# Patient Record
Sex: MALE | Race: AMERICAN INDIAN OR ALASKA NATIVE | URBAN - METROPOLITAN AREA
[De-identification: names, ages, dates, MRNs, and addresses within clinical notes are randomized per-mention and may not be internally consistent; named-entity substitution may affect disease eponyms.]

---

## 2017-01-09 ENCOUNTER — HOSPITAL ENCOUNTER (EMERGENCY)
Facility: CLINIC | Age: 37
Discharge: HOME | End: 2017-01-09
Attending: EMERGENCY MEDICINE | Admitting: EMERGENCY MEDICINE
Payer: MEDICAID

## 2017-01-09 VITALS
DIASTOLIC BLOOD PRESSURE: 97 MMHG | RESPIRATION RATE: 16 BRPM | SYSTOLIC BLOOD PRESSURE: 129 MMHG | OXYGEN SATURATION: 94 % | HEART RATE: 95 BPM | TEMPERATURE: 95 F | WEIGHT: 218.56 LBS | BODY MASS INDEX: 31.36 KG/M2

## 2017-01-09 DIAGNOSIS — L60.0 INGROWING TOENAIL OF RIGHT FOOT: ICD-10-CM

## 2017-01-09 PROCEDURE — 99284 EMERGENCY DEPT VISIT MOD MDM: CPT | Mod: Z6 | Performed by: EMERGENCY MEDICINE

## 2017-01-09 PROCEDURE — 99282 EMERGENCY DEPT VISIT SF MDM: CPT | Performed by: EMERGENCY MEDICINE

## 2017-01-09 ASSESSMENT — ENCOUNTER SYMPTOMS
SHORTNESS OF BREATH: 0
ABDOMINAL PAIN: 0
FEVER: 0

## 2017-01-09 NOTE — ED PROVIDER NOTES
History     Chief Complaint   Patient presents with     Ingrown Toenail     States he has had this for a month.  Tried to tear it off himself.  Thinks it is infected.      HPI  Isaiah Hurst is a 36 year old male who presents to the Emergency Department with an right toe pain. Patient reports pain in his first toe on his right foot. He states that he has had this pain for about a month. He has been trying to pull off an ingrown toenail but has been unable to do this successfully. He has no history of ingrown toenail on his foot.     Patient is a smoker.    I have reviewed the Medications, Allergies, Past Medical and Surgical History, and Social History in the Contact At Once! system.    PAST MEDICAL HISTORY  Past Medical History   Diagnosis Date     Hypertension      Depressive disorder      PAST SURGICAL HISTORY  No past surgical history on file.  FAMILY HISTORY  No family history on file.  SOCIAL HISTORY  Social History   Substance Use Topics     Smoking status: Current Every Day Smoker -- 0.25 packs/day     Smokeless tobacco: Never Used     Alcohol Use: Yes     MEDICATIONS  No current facility-administered medications for this encounter.     Current Outpatient Prescriptions   Medication     amoxicillin-clavulanate (AUGMENTIN) 875-125 MG per tablet     hydrOXYzine (ATARAX) 50 MG tablet     FLUOXETINE HCL PO     PROPRANOLOL HCL PO     ALLERGIES  Allergies   Allergen Reactions     Naproxen Rash        Review of Systems   Constitutional: Negative for fever.   Respiratory: Negative for shortness of breath.    Cardiovascular: Negative for chest pain.   Gastrointestinal: Negative for abdominal pain.   Musculoskeletal:        Right great toe pain   All other systems reviewed and are negative.      Physical Exam   BP: (!) 150/95 mmHg  Pulse: 95  Temp: 95  F (35  C)  Resp: 16  Weight: 99.139 kg (218 lb 9 oz)  SpO2: 98 %  Physical Exam   Constitutional: No distress.   HENT:   Head: Atraumatic.   Mouth/Throat: Oropharynx is  clear and moist. No oropharyngeal exudate.   Eyes: Pupils are equal, round, and reactive to light. No scleral icterus.   Cardiovascular: Normal heart sounds and intact distal pulses.    Pulmonary/Chest: Breath sounds normal. No respiratory distress.   Abdominal: Soft. Bowel sounds are normal. There is no tenderness.   Musculoskeletal: He exhibits no edema.        Right foot: There is tenderness (over medial aspect of right greater hallux, there is skin overgrowing the toe associated redness and swelling) and swelling. There is normal range of motion and no bony tenderness.   Skin: Skin is warm. No rash noted. He is not diaphoretic.   Nursing note and vitals reviewed.      ED Course   Procedures               Labs Ordered and Resulted from Time of ED Arrival Up to the Time of Departure from the ED - No data to display    Assessments & Plan (with Medical Decision Making)   36-year-old male presents for evaluation of right great toe pain.  He is found to have evidence of an infected right greater hallux.  He was given instructions on warm water soaks as well as manipulating the medial skin to allow the nail to grow up.  He will be discharged on Augmentin with instructions to use Tylenol and/or ibuprofen.  I have reviewed the nursing notes.    I have reviewed the findings, diagnosis, plan and need for follow up with the patient.    New Prescriptions    AMOXICILLIN-CLAVULANATE (AUGMENTIN) 875-125 MG PER TABLET    Take 1 tablet by mouth 2 times daily for 7 days       Final diagnoses:   Ingrowing toenail of right foot     IDaniel, am serving as a trained medical scribe to document services personally performed by Koko Ribeiro MD, based on the provider's statements to me.      Koko DUMONT MD, was physically present and have reviewed and verified the accuracy of this note documented by Daniel Mack.    1/9/2017   [facility], EMERGENCY DEPARTMENT      Med Ribeiro MD  01/09/17 [id number]

## 2017-01-09 NOTE — ED AVS SNAPSHOT
[facility], Emergency Department    [address]    [facility] [address]    Phone:  [phone]    Fax:  [phone]                                       Isaiah Hurst   MRN: [id number]    Department:  [facility], Emergency Department   Date of Visit:  1/9/2017           Patient Information     Date Of Birth          1980        Your diagnoses for this visit were:     Ingrowing toenail of right foot        You were seen by Med Ribeiro MD.      Follow-up Information     Follow up with Lore Arellano NP.    Contact information:     [facility]  [address]   [facility] [address]  [phone]          Discharge Instructions         Ingrown Toenail, Infected (Antibiotics, No Excision)  An ingrown toenail occurs when the nail grows sideways into the skin alongside the nail. This can cause pain. It can also lead to an infection with redness, swelling and sometimes drainage.  Cause  The most common cause of an ingrown toenail is trimming your nails wrong. Most people trim the nails too close to the skin and try to round the nail too tightly around the shape of the toe. When you do this, the nail can grow into the skin of your toe. While it may look nice, your toenail can grow into the skin and cause an infection.  Other causes include injury or wearing shoes that are too short or tight. This can cause the same problem that happens when trimming your nails. Your genetics can also make this more likely to happen.  Symptoms  The following are the most common symptoms of an ingrown toenail:     Pain    Redness    Swelling    Drainage  Treatment  The best thing to do for an ingrown toenail is treat it as soon as you see there is a problem. The longer you wait to do something, the worse it is likely to get. Sometimes it gets worse quickly, other times it may take awhile. It can even feel better for a while, and then get worse.  Inflammation  If the infection is mild, you  may be able to take care of it at home with the following measures:    Frequent warm water soaks    Keeping it clean    Wearing loose, comfortable shoes or sandals  Another method involves using a small piece of cotton or waxed dental floss to gently lift up the corner of the problem nail. Change the cotton or floss frequently, especially if it gets dirty.  Infection  If your infection is mild, and home care isn't working, or if the infection gets worse, see your health care provider. Signs of worsening infection include:    Swelling    Redness    Pus drainage  In some cases, you may need antibiotics along with warm soaks. If after 2 to 3 days of antibiotic  the toenail doesn't get better or gets worse, part of the nail may need to be removed to drain the infection. With treatment, it can take 1 to 2 weeks to clear up completely.  Home care  Wound care  For the next 3 days, soak and clean your toe in warm water a few times a day.  1) Twice a day for the first 3 days, clean and soak the toe as follows:    Soak your foot in a tub of warm water for 5 minutes. Or, hold your toe under a faucet of warm running water for 5 minutes.    Clean any remaining crust away with soap and water using a cotton swab.    Put a small amount of antibiotic ointment on the infected area.  2) Change the dressing or bandage every time you soak or clean it, or whenever it becomes wet or dirty.  3) If you were prescribed antibiotics, take them as directed until they are all gone.  4) Wear comfortable shoes with a lot of toe room, or open-toe sandals, while your toe is healing.  Medications    You can take acetaminophen or ibuprofen for pain, unless you were given a different pain medicine to use. If you have chronic liver or kidney disease, ever had a stomach ulcer or gastrointestinal bleeding, or are taking blood thinner medications, talk with your doctor before using these medicines.    If you were given antibiotics, take them until they are  used up or your doctor tells you to stop, even if the wound looks better.  Prevention  To prevent ingrown toenails:  1) Wear shoes that fit well. Avoid shoes that pinch the toes together.  2) When you trim your toenails, do not cut them too short. Cut straight across at the top and do not round the edges.  3) Do not use a sharp object to clean under your nail since this might cause an infection.  4) If the toenail starts to grow into the skin again, put a small piece of waxed dental floss or cotton under that side of the nail to help it grow out straight.  Follow-up care  Follow up with your doctor or this facility as advised by our staff.  When to seek medical care  Get prompt medical attention if any of the following occur:    Increasing redness, pain or swelling of the toe    Red streaks in the skin leading away from the wound    Pus or fluid drainage    Fever of 100.4  F (38  C) or higher, or as directed by your health care provider    1874-2554 [employer]. [address] [place] [address]. All rights reserved. This information is not intended as a substitute for professional medical care. Always follow your healthcare professional's instructions.          Understanding Ingrown Toenails  An ingrown nail is the result of a nail growing into the skin that surrounds it. This often occurs at either edge of the big toe. Ingrown nails may be caused by improper trimming, inherited nail deformities, injuries, fungal infections, or pressure.    Symptoms  Ingrown nails may cause pain at the tip of the toe or all the way to the base of the toe. The pain is often worse while walking. An ingrown nail may also lead to infection, inflammation, or a more serious condition. If it s infected, you might see pus or redness.  Evaluation  To determine the extent of your problem, your doctor examines and possibly presses the painful area. If other problems are suspected, blood tests, cultures, or X-rays may be  done as well.  Treatment  If the nail isn t infected, your doctor may trim the corner of it to help relieve your symptoms. He or she may need to remove one side of your nail back to the cuticle. The base of the nail may then be treated with a chemical to keep the ingrown part from growing back. Severe infections or ingrown nails may require antibiotics and temporary or permanent removal of a portion of the nail. To prevent pain, a local anesthetic may be used in these procedures. This treatment is usually done at your doctor's office.  Prevention  Many nail problems can be prevented by wearing the right shoes and trimming your nails properly. To help avoid infection, keep your feet clean and dry. If you have diabetes, talk with your doctor before doing any foot self-care.    The right shoes: Get your feet measured (your size may change as you age). Wear shoes that are supportive and roomy enough for your toes to wiggle. Look for shoes made of natural materials such as leather, which allow your feet to breathe.    Proper trimming: To avoid problems, trim your toenails straight across without cutting down into the corners. If you can t trim your own nails, ask your podiatrist to do so for you.    1771-1397 [employer]. [address] [place] [address], [place] [address]. All rights reserved. This information is not intended as a substitute for professional medical care. Always follow your healthcare professional's instructions.          24 Hour Appointment Hotline       To make an appointment at any [facility], call [phone] ([phone]). If you don't have a family doctor or clinic, we will help you find one. [facility] are conveniently located to serve the needs of you and your family.             Review of your medicines      START taking        Dose / Directions Last dose taken    amoxicillin-clavulanate 875-125 MG per tablet   Commonly known as:  AUGMENTIN   Dose:  1 tablet   Quantity:  14  "tablet        Take 1 tablet by mouth 2 times daily for 7 days   Refills:  0          Our records show that you are taking the medicines listed below. If these are incorrect, please call your family doctor or clinic.        Dose / Directions Last dose taken    FLUOXETINE HCL PO   Dose:  20 mg        Take 20 mg by mouth daily   Refills:  0        hydrOXYzine 50 MG tablet   Commonly known as:  ATARAX   Dose:  50 mg   Quantity:  10 tablet        Take 1 tablet (50 mg) by mouth every 6 hours as needed for itching   Refills:  0        PROPRANOLOL HCL PO   Dose:  10 mg        Take 10 mg by mouth 3 times daily   Refills:  0                Prescriptions were sent or printed at these locations (1 Prescription)                   Other Prescriptions                Printed at Department/Unit printer (1 of 1)         amoxicillin-clavulanate (AUGMENTIN) 875-125 MG per tablet                Orders Needing Specimen Collection     None      Pending Results     No orders found from 1/8/2017 to 1/10/2017.            Pending Culture Results     No orders found from 1/8/2017 to 1/10/2017.            Thank you for choosing [facility]       Thank you for choosing [facility] for your care. Our goal is always to provide you with excellent care. Hearing back from our patients is one way we can continue to improve our services. Please take a few minutes to complete the written survey that you may receive in the mail after you visit with us. Thank you!        [employer] Information     [employer] lets you send messages to your doctor, view your test results, renew your prescriptions, schedule appointments and more. To sign up, go to [email or web address] . Click on \"Log in\" on the left side of the screen, which will take you to the Welcome page. Then click on \"Sign up Now\" on the right side of the page.     You will be asked to enter the access code listed below, as well as some personal information. Please follow the directions to create your username " and password.     Your access code is: [email or web address]  Expires: 2017  2:49 PM     Your access code will  in 90 days. If you need help or a new code, please call your [facility] clinic or [phone].        Care EveryWhere ID     This is your Care EveryWhere ID. This could be used by other organizations to access your [facility] medical records  [id number]        After Visit Summary       This is your record. Keep this with you and show to your community pharmacist(s) and doctor(s) at your next visit.

## 2017-01-09 NOTE — ED AVS SNAPSHOT
[facility], [place], Emergency Department    [address]    [facility] [address]    Phone:  [phone]    Fax:  [phone]                                       Isaiah Hurst   MRN: [id number]    Department:  [facility], Emergency Department   Date of Visit:  1/9/2017           After Visit Summary Signature Page     I have received my discharge instructions, and my questions have been answered. I have discussed any challenges I see with this plan with the nurse or doctor.    ..........................................................................................................................................  Patient/Patient Representative Signature      ..........................................................................................................................................  Patient Representative Print Name and Relationship to Patient    ..................................................               ................................................  Date                                            Time    ..........................................................................................................................................  Reviewed by Signature/Title    ...................................................              ..............................................  Date                                                            Time

## 2017-01-09 NOTE — DISCHARGE INSTRUCTIONS
Ingrown Toenail, Infected (Antibiotics, No Excision)  An ingrown toenail occurs when the nail grows sideways into the skin alongside the nail. This can cause pain. It can also lead to an infection with redness, swelling and sometimes drainage.  Cause  The most common cause of an ingrown toenail is trimming your nails wrong. Most people trim the nails too close to the skin and try to round the nail too tightly around the shape of the toe. When you do this, the nail can grow into the skin of your toe. While it may look nice, your toenail can grow into the skin and cause an infection.  Other causes include injury or wearing shoes that are too short or tight. This can cause the same problem that happens when trimming your nails. Your genetics can also make this more likely to happen.  Symptoms  The following are the most common symptoms of an ingrown toenail:     Pain    Redness    Swelling    Drainage  Treatment  The best thing to do for an ingrown toenail is treat it as soon as you see there is a problem. The longer you wait to do something, the worse it is likely to get. Sometimes it gets worse quickly, other times it may take awhile. It can even feel better for a while, and then get worse.  Inflammation  If the infection is mild, you may be able to take care of it at home with the following measures:    Frequent warm water soaks    Keeping it clean    Wearing loose, comfortable shoes or sandals  Another method involves using a small piece of cotton or waxed dental floss to gently lift up the corner of the problem nail. Change the cotton or floss frequently, especially if it gets dirty.  Infection  If your infection is mild, and home care isn't working, or if the infection gets worse, see your health care provider. Signs of worsening infection include:    Swelling    Redness    Pus drainage  In some cases, you may need antibiotics along with warm soaks. If after 2 to 3 days of antibiotic  the toenail doesn't get  better or gets worse, part of the nail may need to be removed to drain the infection. With treatment, it can take 1 to 2 weeks to clear up completely.  Home care  Wound care  For the next 3 days, soak and clean your toe in warm water a few times a day.  1) Twice a day for the first 3 days, clean and soak the toe as follows:    Soak your foot in a tub of warm water for 5 minutes. Or, hold your toe under a faucet of warm running water for 5 minutes.    Clean any remaining crust away with soap and water using a cotton swab.    Put a small amount of antibiotic ointment on the infected area.  2) Change the dressing or bandage every time you soak or clean it, or whenever it becomes wet or dirty.  3) If you were prescribed antibiotics, take them as directed until they are all gone.  4) Wear comfortable shoes with a lot of toe room, or open-toe sandals, while your toe is healing.  Medications    You can take acetaminophen or ibuprofen for pain, unless you were given a different pain medicine to use. If you have chronic liver or kidney disease, ever had a stomach ulcer or gastrointestinal bleeding, or are taking blood thinner medications, talk with your doctor before using these medicines.    If you were given antibiotics, take them until they are used up or your doctor tells you to stop, even if the wound looks better.  Prevention  To prevent ingrown toenails:  1) Wear shoes that fit well. Avoid shoes that pinch the toes together.  2) When you trim your toenails, do not cut them too short. Cut straight across at the top and do not round the edges.  3) Do not use a sharp object to clean under your nail since this might cause an infection.  4) If the toenail starts to grow into the skin again, put a small piece of waxed dental floss or cotton under that side of the nail to help it grow out straight.  Follow-up care  Follow up with your doctor or this facility as advised by our staff.  When to seek medical care  Get prompt  medical attention if any of the following occur:    Increasing redness, pain or swelling of the toe    Red streaks in the skin leading away from the wound    Pus or fluid drainage    Fever of 100.4  F (38  C) or higher, or as directed by your health care provider    9102-7111 [employer]. [address] [place] [address]. All rights reserved. This information is not intended as a substitute for professional medical care. Always follow your healthcare professional's instructions.          Understanding Ingrown Toenails  An ingrown nail is the result of a nail growing into the skin that surrounds it. This often occurs at either edge of the big toe. Ingrown nails may be caused by improper trimming, inherited nail deformities, injuries, fungal infections, or pressure.    Symptoms  Ingrown nails may cause pain at the tip of the toe or all the way to the base of the toe. The pain is often worse while walking. An ingrown nail may also lead to infection, inflammation, or a more serious condition. If it s infected, you might see pus or redness.  Evaluation  To determine the extent of your problem, your doctor examines and possibly presses the painful area. If other problems are suspected, blood tests, cultures, or X-rays may be done as well.  Treatment  If the nail isn t infected, your doctor may trim the corner of it to help relieve your symptoms. He or she may need to remove one side of your nail back to the cuticle. The base of the nail may then be treated with a chemical to keep the ingrown part from growing back. Severe infections or ingrown nails may require antibiotics and temporary or permanent removal of a portion of the nail. To prevent pain, a local anesthetic may be used in these procedures. This treatment is usually done at your doctor's office.  Prevention  Many nail problems can be prevented by wearing the right shoes and trimming your nails properly. To help avoid infection, keep your  feet clean and dry. If you have diabetes, talk with your doctor before doing any foot self-care.    The right shoes: Get your feet measured (your size may change as you age). Wear shoes that are supportive and roomy enough for your toes to wiggle. Look for shoes made of natural materials such as leather, which allow your feet to breathe.    Proper trimming: To avoid problems, trim your toenails straight across without cutting down into the corners. If you can t trim your own nails, ask your podiatrist to do so for you.    [phone] [employer]. [address] [place] [address], [place] [address]. All rights reserved. This information is not intended as a substitute for professional medical care. Always follow your healthcare professional's instructions.

## 2017-02-06 ENCOUNTER — TRANSFERRED RECORDS (OUTPATIENT)
Dept: HEALTH INFORMATION MANAGEMENT | Facility: CLINIC | Age: 37
End: 2017-02-06

## 2017-02-07 ENCOUNTER — ANESTHESIA EVENT (OUTPATIENT)
Dept: GASTROENTEROLOGY | Facility: CLINIC | Age: 37
DRG: 438 | End: 2017-02-07
Payer: COMMERCIAL

## 2017-02-07 ENCOUNTER — ANESTHESIA (OUTPATIENT)
Dept: GASTROENTEROLOGY | Facility: CLINIC | Age: 37
DRG: 438 | End: 2017-02-07
Payer: COMMERCIAL

## 2017-02-07 ENCOUNTER — APPOINTMENT (OUTPATIENT)
Dept: ULTRASOUND IMAGING | Facility: CLINIC | Age: 37
DRG: 438 | End: 2017-02-07
Attending: INTERNAL MEDICINE
Payer: COMMERCIAL

## 2017-02-07 ENCOUNTER — HOSPITAL ENCOUNTER (INPATIENT)
Facility: CLINIC | Age: 37
LOS: 1 days | Discharge: ANOTHER HOSPITAL | DRG: 438 | End: 2017-02-07
Attending: INTERNAL MEDICINE | Admitting: INTERNAL MEDICINE
Payer: COMMERCIAL

## 2017-02-07 VITALS
DIASTOLIC BLOOD PRESSURE: 124 MMHG | TEMPERATURE: 100.3 F | SYSTOLIC BLOOD PRESSURE: 172 MMHG | OXYGEN SATURATION: 94 % | HEART RATE: 100 BPM | RESPIRATION RATE: 20 BRPM | BODY MASS INDEX: 32.04 KG/M2 | WEIGHT: 223.8 LBS | HEIGHT: 70 IN

## 2017-02-07 DIAGNOSIS — K85.91 ACUTE NECROTIZING PANCREATITIS: ICD-10-CM

## 2017-02-07 DIAGNOSIS — B37.81 ESOPHAGEAL CANDIDIASIS (H): ICD-10-CM

## 2017-02-07 DIAGNOSIS — F10.239 ALCOHOL DEPENDENCE WITH WITHDRAWAL WITH COMPLICATION (H): Primary | ICD-10-CM

## 2017-02-07 DIAGNOSIS — I82.890 SPLENIC VEIN THROMBOSIS: ICD-10-CM

## 2017-02-07 DIAGNOSIS — K29.21 ALCOHOLIC GASTRITIS WITH HEMORRHAGE, UNSPECIFIED CHRONICITY: ICD-10-CM

## 2017-02-07 LAB
ABO + RH BLD: NORMAL
ABO + RH BLD: NORMAL
ALBUMIN SERPL-MCNC: 2.6 G/DL (ref 3.4–5)
ALBUMIN UR-MCNC: 30 MG/DL
ALP SERPL-CCNC: 116 U/L (ref 40–150)
ALT SERPL W P-5'-P-CCNC: 149 U/L (ref 0–70)
AMYLASE SERPL-CCNC: 82 U/L (ref 30–110)
ANION GAP SERPL CALCULATED.3IONS-SCNC: 14 MMOL/L (ref 3–14)
ANION GAP SERPL CALCULATED.3IONS-SCNC: 14 MMOL/L (ref 3–14)
APPEARANCE UR: CLEAR
AST SERPL W P-5'-P-CCNC: 297 U/L (ref 0–45)
BASOPHILS # BLD AUTO: 0 10E9/L (ref 0–0.2)
BASOPHILS NFR BLD AUTO: 0 %
BILIRUB SERPL-MCNC: 2.4 MG/DL (ref 0.2–1.3)
BILIRUB UR QL STRIP: NEGATIVE
BLD GP AB SCN SERPL QL: NORMAL
BLOOD BANK CMNT PATIENT-IMP: NORMAL
BUN SERPL-MCNC: 13 MG/DL (ref 7–30)
BUN SERPL-MCNC: 15 MG/DL (ref 7–30)
CALCIUM SERPL-MCNC: 6.6 MG/DL (ref 8.5–10.1)
CALCIUM SERPL-MCNC: 6.7 MG/DL (ref 8.5–10.1)
CHLORIDE SERPL-SCNC: 94 MMOL/L (ref 94–109)
CHLORIDE SERPL-SCNC: 94 MMOL/L (ref 94–109)
CO2 SERPL-SCNC: 19 MMOL/L (ref 20–32)
CO2 SERPL-SCNC: 21 MMOL/L (ref 20–32)
COLOR UR AUTO: YELLOW
CREAT SERPL-MCNC: 0.71 MG/DL (ref 0.66–1.25)
CREAT SERPL-MCNC: 0.73 MG/DL (ref 0.66–1.25)
DIFFERENTIAL METHOD BLD: ABNORMAL
EOSINOPHIL # BLD AUTO: 0 10E9/L (ref 0–0.7)
EOSINOPHIL NFR BLD AUTO: 0 %
ERYTHROCYTE [DISTWIDTH] IN BLOOD BY AUTOMATED COUNT: 15.3 % (ref 10–15)
ERYTHROCYTE [DISTWIDTH] IN BLOOD BY AUTOMATED COUNT: 15.5 % (ref 10–15)
GFR SERPL CREATININE-BSD FRML MDRD: ABNORMAL ML/MIN/1.7M2
GFR SERPL CREATININE-BSD FRML MDRD: ABNORMAL ML/MIN/1.7M2
GLUCOSE SERPL-MCNC: 104 MG/DL (ref 70–99)
GLUCOSE SERPL-MCNC: 137 MG/DL (ref 70–99)
GLUCOSE UR STRIP-MCNC: NEGATIVE MG/DL
HCT VFR BLD AUTO: 47.8 % (ref 40–53)
HCT VFR BLD AUTO: 48.4 % (ref 40–53)
HGB BLD-MCNC: 16.6 G/DL (ref 13.3–17.7)
HGB BLD-MCNC: 16.9 G/DL (ref 13.3–17.7)
HGB UR QL STRIP: ABNORMAL
IMM GRANULOCYTES # BLD: 0.1 10E9/L (ref 0–0.4)
IMM GRANULOCYTES NFR BLD: 0.5 %
INR PPP: 1.42 (ref 0.86–1.14)
KETONES UR STRIP-MCNC: NEGATIVE MG/DL
LACTATE BLD-SCNC: 2.1 MMOL/L (ref 0.7–2.1)
LACTATE BLD-SCNC: 3.1 MMOL/L (ref 0.7–2.1)
LEUKOCYTE ESTERASE UR QL STRIP: NEGATIVE
LIPASE SERPL-CCNC: 814 U/L (ref 73–393)
LYMPHOCYTES # BLD AUTO: 1.5 10E9/L (ref 0.8–5.3)
LYMPHOCYTES NFR BLD AUTO: 5.9 %
MAGNESIUM SERPL-MCNC: 1.6 MG/DL (ref 1.6–2.3)
MCH RBC QN AUTO: 30.8 PG (ref 26.5–33)
MCH RBC QN AUTO: 31.3 PG (ref 26.5–33)
MCHC RBC AUTO-ENTMCNC: 34.7 G/DL (ref 31.5–36.5)
MCHC RBC AUTO-ENTMCNC: 34.9 G/DL (ref 31.5–36.5)
MCV RBC AUTO: 88 FL (ref 78–100)
MCV RBC AUTO: 90 FL (ref 78–100)
MONOCYTES # BLD AUTO: 1.4 10E9/L (ref 0–1.3)
MONOCYTES NFR BLD AUTO: 5.6 %
MUCOUS THREADS #/AREA URNS LPF: PRESENT /LPF
NEUTROPHILS # BLD AUTO: 21.6 10E9/L (ref 1.6–8.3)
NEUTROPHILS NFR BLD AUTO: 88 %
NITRATE UR QL: NEGATIVE
NRBC # BLD AUTO: 0 10*3/UL
NRBC BLD AUTO-RTO: 0 /100
PH UR STRIP: 6 PH (ref 5–7)
PHOSPHATE SERPL-MCNC: 2.7 MG/DL (ref 2.5–4.5)
PLATELET # BLD AUTO: 68 10E9/L (ref 150–450)
PLATELET # BLD AUTO: 86 10E9/L (ref 150–450)
POTASSIUM SERPL-SCNC: 3.8 MMOL/L (ref 3.4–5.3)
POTASSIUM SERPL-SCNC: 4.1 MMOL/L (ref 3.4–5.3)
PROT SERPL-MCNC: 6 G/DL (ref 6.8–8.8)
RBC # BLD AUTO: 5.3 10E12/L (ref 4.4–5.9)
RBC # BLD AUTO: 5.49 10E12/L (ref 4.4–5.9)
RBC #/AREA URNS AUTO: 2 /HPF (ref 0–2)
SODIUM SERPL-SCNC: 127 MMOL/L (ref 133–144)
SODIUM SERPL-SCNC: 128 MMOL/L (ref 133–144)
SP GR UR STRIP: >1.05 (ref 1–1.03)
SPECIMEN EXP DATE BLD: NORMAL
TRANS CELLS #/AREA URNS HPF: <1 /HPF (ref 0–1)
UPPER GI ENDOSCOPY: NORMAL
URN SPEC COLLECT METH UR: ABNORMAL
UROBILINOGEN UR STRIP-MCNC: 2 MG/DL (ref 0–2)
WBC # BLD AUTO: 22.7 10E9/L (ref 4–11)
WBC # BLD AUTO: 24.6 10E9/L (ref 4–11)
WBC #/AREA URNS AUTO: 4 /HPF (ref 0–2)

## 2017-02-07 PROCEDURE — 25000132 ZZH RX MED GY IP 250 OP 250 PS 637: Performed by: NURSE ANESTHETIST, CERTIFIED REGISTERED

## 2017-02-07 PROCEDURE — 87040 BLOOD CULTURE FOR BACTERIA: CPT | Performed by: NURSE PRACTITIONER

## 2017-02-07 PROCEDURE — 86901 BLOOD TYPING SEROLOGIC RH(D): CPT | Performed by: INTERNAL MEDICINE

## 2017-02-07 PROCEDURE — 25000132 ZZH RX MED GY IP 250 OP 250 PS 637: Performed by: INTERNAL MEDICINE

## 2017-02-07 PROCEDURE — 25000128 H RX IP 250 OP 636

## 2017-02-07 PROCEDURE — 25000128 H RX IP 250 OP 636: Performed by: INTERNAL MEDICINE

## 2017-02-07 PROCEDURE — 81001 URINALYSIS AUTO W/SCOPE: CPT | Performed by: NURSE PRACTITIONER

## 2017-02-07 PROCEDURE — 83605 ASSAY OF LACTIC ACID: CPT | Performed by: INTERNAL MEDICINE

## 2017-02-07 PROCEDURE — 37000008 ZZH ANESTHESIA TECHNICAL FEE, 1ST 30 MIN: Performed by: INTERNAL MEDICINE

## 2017-02-07 PROCEDURE — 36415 COLL VENOUS BLD VENIPUNCTURE: CPT | Performed by: NURSE PRACTITIONER

## 2017-02-07 PROCEDURE — 80053 COMPREHEN METABOLIC PANEL: CPT | Performed by: INTERNAL MEDICINE

## 2017-02-07 PROCEDURE — 40000141 ZZH STATISTIC PERIPHERAL IV START W/O US GUIDANCE

## 2017-02-07 PROCEDURE — 93975 VASCULAR STUDY: CPT | Mod: TC

## 2017-02-07 PROCEDURE — 25000131 ZZH RX MED GY IP 250 OP 636 PS 637: Performed by: INTERNAL MEDICINE

## 2017-02-07 PROCEDURE — 12000001 ZZH R&B MED SURG/OB UMMC

## 2017-02-07 PROCEDURE — 80048 BASIC METABOLIC PNL TOTAL CA: CPT | Performed by: NURSE PRACTITIONER

## 2017-02-07 PROCEDURE — 25800025 ZZH RX 258: Performed by: NURSE PRACTITIONER

## 2017-02-07 PROCEDURE — 25000125 ZZHC RX 250: Performed by: NURSE ANESTHETIST, CERTIFIED REGISTERED

## 2017-02-07 PROCEDURE — 25800025 ZZH RX 258: Performed by: INTERNAL MEDICINE

## 2017-02-07 PROCEDURE — 25000128 H RX IP 250 OP 636: Performed by: NURSE PRACTITIONER

## 2017-02-07 PROCEDURE — 43235 EGD DIAGNOSTIC BRUSH WASH: CPT | Performed by: INTERNAL MEDICINE

## 2017-02-07 PROCEDURE — 87106 FUNGI IDENTIFICATION YEAST: CPT | Performed by: INTERNAL MEDICINE

## 2017-02-07 PROCEDURE — 37000009 ZZH ANESTHESIA TECHNICAL FEE, EACH ADDTL 15 MIN: Performed by: INTERNAL MEDICINE

## 2017-02-07 PROCEDURE — 87102 FUNGUS ISOLATION CULTURE: CPT | Performed by: INTERNAL MEDICINE

## 2017-02-07 PROCEDURE — 25000128 H RX IP 250 OP 636: Performed by: NURSE ANESTHETIST, CERTIFIED REGISTERED

## 2017-02-07 PROCEDURE — 83735 ASSAY OF MAGNESIUM: CPT | Performed by: INTERNAL MEDICINE

## 2017-02-07 PROCEDURE — 86850 RBC ANTIBODY SCREEN: CPT | Performed by: INTERNAL MEDICINE

## 2017-02-07 PROCEDURE — 25800025 ZZH RX 258: Performed by: NURSE ANESTHETIST, CERTIFIED REGISTERED

## 2017-02-07 PROCEDURE — 85025 COMPLETE CBC W/AUTO DIFF WBC: CPT | Performed by: INTERNAL MEDICINE

## 2017-02-07 PROCEDURE — 36415 COLL VENOUS BLD VENIPUNCTURE: CPT | Performed by: INTERNAL MEDICINE

## 2017-02-07 PROCEDURE — 86900 BLOOD TYPING SEROLOGIC ABO: CPT | Performed by: INTERNAL MEDICINE

## 2017-02-07 PROCEDURE — 25000125 ZZHC RX 250: Performed by: INTERNAL MEDICINE

## 2017-02-07 PROCEDURE — 85610 PROTHROMBIN TIME: CPT | Performed by: INTERNAL MEDICINE

## 2017-02-07 PROCEDURE — 82150 ASSAY OF AMYLASE: CPT | Performed by: INTERNAL MEDICINE

## 2017-02-07 PROCEDURE — 85027 COMPLETE CBC AUTOMATED: CPT | Performed by: NURSE PRACTITIONER

## 2017-02-07 PROCEDURE — 84100 ASSAY OF PHOSPHORUS: CPT | Performed by: INTERNAL MEDICINE

## 2017-02-07 PROCEDURE — 83690 ASSAY OF LIPASE: CPT | Performed by: INTERNAL MEDICINE

## 2017-02-07 RX ORDER — MEPERIDINE HYDROCHLORIDE 25 MG/ML
12.5 INJECTION INTRAMUSCULAR; INTRAVENOUS; SUBCUTANEOUS
Status: DISCONTINUED | OUTPATIENT
Start: 2017-02-07 | End: 2017-02-07 | Stop reason: HOSPADM

## 2017-02-07 RX ORDER — SODIUM CHLORIDE, SODIUM LACTATE, POTASSIUM CHLORIDE, CALCIUM CHLORIDE 600; 310; 30; 20 MG/100ML; MG/100ML; MG/100ML; MG/100ML
INJECTION, SOLUTION INTRAVENOUS CONTINUOUS PRN
Status: DISCONTINUED | OUTPATIENT
Start: 2017-02-07 | End: 2017-02-07

## 2017-02-07 RX ORDER — FLUCONAZOLE 2 MG/ML
200 INJECTION, SOLUTION INTRAVENOUS EVERY 24 HOURS
Status: DISCONTINUED | OUTPATIENT
Start: 2017-02-08 | End: 2017-02-07 | Stop reason: HOSPADM

## 2017-02-07 RX ORDER — POTASSIUM CHLORIDE 29.8 MG/ML
20 INJECTION INTRAVENOUS
Status: DISCONTINUED | OUTPATIENT
Start: 2017-02-07 | End: 2017-02-07 | Stop reason: HOSPADM

## 2017-02-07 RX ORDER — POTASSIUM CHLORIDE 1.5 G/1.58G
20-40 POWDER, FOR SOLUTION ORAL
Status: DISCONTINUED | OUTPATIENT
Start: 2017-02-07 | End: 2017-02-07 | Stop reason: HOSPADM

## 2017-02-07 RX ORDER — SODIUM CHLORIDE, SODIUM LACTATE, POTASSIUM CHLORIDE, CALCIUM CHLORIDE 600; 310; 30; 20 MG/100ML; MG/100ML; MG/100ML; MG/100ML
200 INJECTION, SOLUTION INTRAVENOUS CONTINUOUS
Qty: 1000 ML | Refills: 0 | DISCHARGE
Start: 2017-02-07

## 2017-02-07 RX ORDER — ONDANSETRON 4 MG/1
4 TABLET, ORALLY DISINTEGRATING ORAL EVERY 6 HOURS PRN
Status: DISCONTINUED | OUTPATIENT
Start: 2017-02-07 | End: 2017-02-07 | Stop reason: HOSPADM

## 2017-02-07 RX ORDER — POTASSIUM CHLORIDE 750 MG/1
20-40 TABLET, EXTENDED RELEASE ORAL
Status: DISCONTINUED | OUTPATIENT
Start: 2017-02-07 | End: 2017-02-07 | Stop reason: HOSPADM

## 2017-02-07 RX ORDER — PROPRANOLOL HYDROCHLORIDE 10 MG/1
10 TABLET ORAL 3 TIMES DAILY
Status: DISCONTINUED | OUTPATIENT
Start: 2017-02-07 | End: 2017-02-07 | Stop reason: HOSPADM

## 2017-02-07 RX ORDER — PROPRANOLOL HYDROCHLORIDE 10 MG/1
10 TABLET ORAL 3 TIMES DAILY
Qty: 90 TABLET | DISCHARGE
Start: 2017-02-07 | End: 2018-09-07

## 2017-02-07 RX ORDER — FLUCONAZOLE 2 MG/ML
200 INJECTION, SOLUTION INTRAVENOUS EVERY 24 HOURS
Qty: 3000 ML | DISCHARGE
Start: 2017-02-08

## 2017-02-07 RX ORDER — SODIUM CHLORIDE 9 MG/ML
INJECTION, SOLUTION INTRAVENOUS
Status: COMPLETED
Start: 2017-02-07 | End: 2017-02-07

## 2017-02-07 RX ORDER — LORAZEPAM 1 MG/1
1-4 TABLET ORAL EVERY 30 MIN PRN
Status: DISCONTINUED | OUTPATIENT
Start: 2017-02-07 | End: 2017-02-07 | Stop reason: HOSPADM

## 2017-02-07 RX ORDER — HEPARIN SODIUM 10000 [USP'U]/100ML
0-3500 INJECTION, SOLUTION INTRAVENOUS CONTINUOUS
DISCHARGE
Start: 2017-02-07 | End: 2018-09-07

## 2017-02-07 RX ORDER — HEPARIN SODIUM 10000 [USP'U]/100ML
0-3500 INJECTION, SOLUTION INTRAVENOUS CONTINUOUS
Status: DISCONTINUED | OUTPATIENT
Start: 2017-02-07 | End: 2017-02-07

## 2017-02-07 RX ORDER — NALOXONE HYDROCHLORIDE 0.4 MG/ML
.1-.4 INJECTION, SOLUTION INTRAMUSCULAR; INTRAVENOUS; SUBCUTANEOUS
Status: DISCONTINUED | OUTPATIENT
Start: 2017-02-07 | End: 2017-02-07 | Stop reason: HOSPADM

## 2017-02-07 RX ORDER — ONDANSETRON 4 MG/1
4 TABLET, ORALLY DISINTEGRATING ORAL EVERY 30 MIN PRN
Status: ACTIVE | OUTPATIENT
Start: 2017-02-07 | End: 2017-02-07

## 2017-02-07 RX ORDER — FLUCONAZOLE 2 MG/ML
400 INJECTION, SOLUTION INTRAVENOUS ONCE
Status: COMPLETED | OUTPATIENT
Start: 2017-02-07 | End: 2017-02-07

## 2017-02-07 RX ORDER — ONDANSETRON 2 MG/ML
4 INJECTION INTRAMUSCULAR; INTRAVENOUS EVERY 6 HOURS PRN
Status: DISCONTINUED | OUTPATIENT
Start: 2017-02-07 | End: 2017-02-07 | Stop reason: HOSPADM

## 2017-02-07 RX ORDER — HYDROMORPHONE HYDROCHLORIDE 1 MG/ML
.5-1 INJECTION, SOLUTION INTRAMUSCULAR; INTRAVENOUS; SUBCUTANEOUS
Status: DISCONTINUED | OUTPATIENT
Start: 2017-02-07 | End: 2017-02-07 | Stop reason: HOSPADM

## 2017-02-07 RX ORDER — ONDANSETRON 2 MG/ML
4 INJECTION INTRAMUSCULAR; INTRAVENOUS EVERY 30 MIN PRN
Qty: 15 ML | DISCHARGE
Start: 2017-02-07

## 2017-02-07 RX ORDER — MAGNESIUM SULFATE HEPTAHYDRATE 40 MG/ML
4 INJECTION, SOLUTION INTRAVENOUS EVERY 4 HOURS PRN
Status: DISCONTINUED | OUTPATIENT
Start: 2017-02-07 | End: 2017-02-07 | Stop reason: HOSPADM

## 2017-02-07 RX ORDER — SODIUM CHLORIDE, SODIUM LACTATE, POTASSIUM CHLORIDE, CALCIUM CHLORIDE 600; 310; 30; 20 MG/100ML; MG/100ML; MG/100ML; MG/100ML
INJECTION, SOLUTION INTRAVENOUS CONTINUOUS
Status: DISCONTINUED | OUTPATIENT
Start: 2017-02-07 | End: 2017-02-07

## 2017-02-07 RX ORDER — ONDANSETRON 2 MG/ML
4 INJECTION INTRAMUSCULAR; INTRAVENOUS EVERY 30 MIN PRN
Status: ACTIVE | OUTPATIENT
Start: 2017-02-07 | End: 2017-02-07

## 2017-02-07 RX ORDER — HEPARIN SODIUM 10000 [USP'U]/100ML
0-3500 INJECTION, SOLUTION INTRAVENOUS CONTINUOUS
Status: DISCONTINUED | OUTPATIENT
Start: 2017-02-07 | End: 2017-02-07 | Stop reason: HOSPADM

## 2017-02-07 RX ORDER — PROPOFOL 10 MG/ML
INJECTION, EMULSION INTRAVENOUS CONTINUOUS PRN
Status: DISCONTINUED | OUTPATIENT
Start: 2017-02-07 | End: 2017-02-07

## 2017-02-07 RX ORDER — LORAZEPAM 1 MG/1
TABLET ORAL
Qty: 60 TABLET | Status: SHIPPED | DISCHARGE
Start: 2017-02-07

## 2017-02-07 RX ORDER — FENTANYL CITRATE 50 UG/ML
INJECTION, SOLUTION INTRAMUSCULAR; INTRAVENOUS PRN
Status: DISCONTINUED | OUTPATIENT
Start: 2017-02-07 | End: 2017-02-07

## 2017-02-07 RX ORDER — CEFTRIAXONE 1 G/1
1 INJECTION, POWDER, FOR SOLUTION INTRAMUSCULAR; INTRAVENOUS EVERY 24 HOURS
Status: DISCONTINUED | OUTPATIENT
Start: 2017-02-07 | End: 2017-02-07

## 2017-02-07 RX ORDER — ONDANSETRON 2 MG/ML
4 INJECTION INTRAMUSCULAR; INTRAVENOUS EVERY 6 HOURS PRN
Qty: 15 ML | DISCHARGE
Start: 2017-02-07

## 2017-02-07 RX ORDER — POTASSIUM CHLORIDE 7.45 MG/ML
10 INJECTION INTRAVENOUS
Status: DISCONTINUED | OUTPATIENT
Start: 2017-02-07 | End: 2017-02-07 | Stop reason: HOSPADM

## 2017-02-07 RX ORDER — SODIUM CHLORIDE, SODIUM LACTATE, POTASSIUM CHLORIDE, CALCIUM CHLORIDE 600; 310; 30; 20 MG/100ML; MG/100ML; MG/100ML; MG/100ML
INJECTION, SOLUTION INTRAVENOUS CONTINUOUS
Status: DISCONTINUED | OUTPATIENT
Start: 2017-02-07 | End: 2017-02-07 | Stop reason: HOSPADM

## 2017-02-07 RX ADMIN — SODIUM CHLORIDE 8 MG/HR: 9 INJECTION, SOLUTION INTRAVENOUS at 10:29

## 2017-02-07 RX ADMIN — HEPARIN SODIUM 1800 UNITS/HR: 10000 INJECTION, SOLUTION INTRAVENOUS at 17:53

## 2017-02-07 RX ADMIN — HYDROMORPHONE HYDROCHLORIDE 1 MG: 10 INJECTION, SOLUTION INTRAMUSCULAR; INTRAVENOUS; SUBCUTANEOUS at 17:23

## 2017-02-07 RX ADMIN — HYDROMORPHONE HYDROCHLORIDE 1 MG: 10 INJECTION, SOLUTION INTRAMUSCULAR; INTRAVENOUS; SUBCUTANEOUS at 09:02

## 2017-02-07 RX ADMIN — FLUCONAZOLE 400 MG: 2 INJECTION INTRAVENOUS at 17:32

## 2017-02-07 RX ADMIN — HYDROMORPHONE HYDROCHLORIDE 1 MG: 10 INJECTION, SOLUTION INTRAMUSCULAR; INTRAVENOUS; SUBCUTANEOUS at 12:30

## 2017-02-07 RX ADMIN — HYDROMORPHONE HYDROCHLORIDE 1 MG: 10 INJECTION, SOLUTION INTRAMUSCULAR; INTRAVENOUS; SUBCUTANEOUS at 20:08

## 2017-02-07 RX ADMIN — OCTREOTIDE ACETATE 50 MCG/HR: 200 INJECTION, SOLUTION INTRAVENOUS; SUBCUTANEOUS at 10:37

## 2017-02-07 RX ADMIN — SODIUM CHLORIDE, POTASSIUM CHLORIDE, SODIUM LACTATE AND CALCIUM CHLORIDE: 600; 310; 30; 20 INJECTION, SOLUTION INTRAVENOUS at 17:32

## 2017-02-07 RX ADMIN — SODIUM CHLORIDE, POTASSIUM CHLORIDE, SODIUM LACTATE AND CALCIUM CHLORIDE: 600; 310; 30; 20 INJECTION, SOLUTION INTRAVENOUS at 13:25

## 2017-02-07 RX ADMIN — FENTANYL CITRATE 50 MCG: 50 INJECTION, SOLUTION INTRAMUSCULAR; INTRAVENOUS at 13:29

## 2017-02-07 RX ADMIN — BENZOCAINE 2 APPLICATOR: 220 SPRAY, METERED PERIODONTAL at 13:29

## 2017-02-07 RX ADMIN — Medication 1 G: at 12:30

## 2017-02-07 RX ADMIN — HYDROMORPHONE HYDROCHLORIDE 1 MG: 10 INJECTION, SOLUTION INTRAMUSCULAR; INTRAVENOUS; SUBCUTANEOUS at 14:46

## 2017-02-07 RX ADMIN — FOLIC ACID: 5 INJECTION, SOLUTION INTRAMUSCULAR; INTRAVENOUS; SUBCUTANEOUS at 08:45

## 2017-02-07 RX ADMIN — HYDROMORPHONE HYDROCHLORIDE 0.5 MG: 10 INJECTION, SOLUTION INTRAMUSCULAR; INTRAVENOUS; SUBCUTANEOUS at 06:04

## 2017-02-07 RX ADMIN — LORAZEPAM 2 MG: 1 TABLET ORAL at 18:05

## 2017-02-07 RX ADMIN — SODIUM CHLORIDE: 0.9 INJECTION, SOLUTION INTRAVENOUS at 12:30

## 2017-02-07 RX ADMIN — ONDANSETRON 4 MG: 2 INJECTION INTRAMUSCULAR; INTRAVENOUS at 06:04

## 2017-02-07 RX ADMIN — PANTOPRAZOLE SODIUM 80 MG: 40 INJECTION, POWDER, FOR SOLUTION INTRAVENOUS at 06:38

## 2017-02-07 RX ADMIN — PROPOFOL 125 MCG/KG/MIN: 10 INJECTION, EMULSION INTRAVENOUS at 13:29

## 2017-02-07 RX ADMIN — SODIUM CHLORIDE, POTASSIUM CHLORIDE, SODIUM LACTATE AND CALCIUM CHLORIDE 1000 ML: 600; 310; 30; 20 INJECTION, SOLUTION INTRAVENOUS at 06:39

## 2017-02-07 RX ADMIN — LORAZEPAM 1 MG: 1 TABLET ORAL at 06:38

## 2017-02-07 RX ADMIN — MIDAZOLAM HYDROCHLORIDE 2 MG: 1 INJECTION, SOLUTION INTRAMUSCULAR; INTRAVENOUS at 13:25

## 2017-02-07 ASSESSMENT — PAIN DESCRIPTION - DESCRIPTORS
DESCRIPTORS: ACHING
DESCRIPTORS: SHARP
DESCRIPTORS: STABBING
DESCRIPTORS: STABBING

## 2017-02-07 NOTE — PROGRESS NOTES
[facility]  Transfer Triage Note    Date of call: 02/07/2017  Time of call: 2:02 AM    Reason for Transfer:Further diagnostic work up, management, and consultation for specialized care  Diagnosis: Necrotizing Pancreatitis    Outside Records: Available  Additional records requested to be faxed to [phone].    Stability of Patient: Patient is vitally stable, with no critical labs, and will likely remain stable throughout the transfer process    Expected Time of Arrival for Transfer: 0-8 hours    Recommendations for Management and Stabilization: Given    Additional Comments: Pt is a 36 year old with history of ETOH abuse, who presents with a 3 day history of generalized abdominal pain and nausea.  ED work-up showed an elevated WBC of 18, Lipase of 1000, and mild transaminitis.  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 He is otherwise vitally stable.  He will be transferred here for continued care, and GI consultation if needed.     Francis Fonseca MD

## 2017-02-07 NOTE — H&P
[facility] History [facility] Physical  Department of Internal Medicine    Patient Name: Isaiah Hurst MRN# [id number]   Age: 36 year old YOB: 1980     Date of Admission:2/7/2017    Primary care provider: Lore Arellano  Date of Service: 2/7/2017  Admitting Team: Atif Muse         Assessment and Plan:   Isaiah Hurst is a 36 year old male with history of ETOH abuse who presents with 3 day of diffuse abdominal pain, nausea, vomiting, coffee ground emesis and melena, and found to have necrotizing pancreatitis.    1. Necrotizing pancreatitis: likely acute on chronic secondary to ETOH, lipase is only mildly elevated, however CT scan at the OSH showed substantial necrosis of the tail of the pancreas without signs of infection. Is significantly hemo-concentrated.  Splenic vein thrombosis suggests there may be some chronicity.    - NPO, Bolus an additional L, and start IV@ 200 cc/hr, aggressive pain control   - CT scan will be uploaded to our system in the morning   - ADAT   - trend labs    2. UGIB: 3 day history of coffee ground emesis and melena and a several month history of intermittent sharp epigastric pain that improves with eating. Likely PUD vs ETOH gastritis, however given the ETOH abuse, and possible portal vein thrombosis an variceal bleed is not out of the question.   - 2 large bore IV   - protonix and octreotide gtt   - trend hgb q6   - consult GI for a possible EGD    3. ETOH abuse: long standing, has seen a  and was preparing to fill out a Rule 25.  Drinks 1 pint to 1 L of vodka daily, and has daily withdrawal symptoms but no history of seizures   - banana bag, continue IV thiamine   - MSSA protocol (is high risk for DTs)   - CD treatment consult for when he is stable.     4. Splenic vein thrombosis/portal vein thrombosis: likely secondary to pancreatitis, CT incidentally found a splenic vein thrombosis extending to the portal vein.     - may consider anti-coagulation  in the future (questionable benefit in the long run) but will hold off in the context of an upper GI bleed.     5. Hyponatremia: Na 129 at OSH, likely hypovolemic related to poor PO intake and repeated emesis vs pancreatitis   - recheck following adequate volume resuscitation    6. Transaminitis: at OSH /, likely secondary to ETOH abuse.  Bilirubin is mildly elevated at 2.3, without signs of obstruction.   - repeat abdominal U/S with dopplers      CODE: Full  Diet/IVF: NPO, banana bag at 200 cc   DVT ppx: mechanical given bleeding  Disposition/Admission Status: Inpatient    [facility]  Internal Medicine Hospitalist & Staff Physician  [facility]  Pager: [phone]           Chief Complaint:   Abdominal pain         HPI:   Mr Omkar Hurst is a 36 year old male with history of ETOH abuse who presents with a 3 day history of abdominal pain.  He states he was in his usual state of health, which includes drinking approximately a liter of vodka daily and experiencing intermittent abdominal pain (though typically this improves with eating) and undergoing transient withdrawal symptoms (sweating, shakiness).  Approximately 3 days ago he noticed his abdominal pain became more generalized, with associated nausea, which was stabbing in nature and made worse by eating.  He also had multiple episodes of emesis that was coffee in color.  He also noticed multiple episodes of melena.  He states he has not experienced these symptoms in the past.  His symptoms persisted and he had been unable to tolerate much oral intake, though continued to drink, until the day of admission.  He presented to an ED in [place], [place] where they observed an elevated WBC at 19.9, Hct of 60.6, Hgb of 15.5.  Other labs were notable for a lipase of 1000.  A CT was ordered that showed signs of pancreatitis with areas of necrosis in the tail of the pancreas as well as a splenic vein thrombosis extending to the portal vein.  He  was transferred here for additional management.         Past Medical History:     Past Medical History   Diagnosis Date     Hypertension      Depressive disorder      Reviewed         Past Surgical History:   No past surgical history on file.  No surgeries         Social History:   Currently single, continues to drink ETOH, 1 L of vodka a day, no other drug use.  Is interested in quitting and has seen about getting a rule 25 filled out.   Social History     Social History     Marital Status: Single     Spouse Name: N/A     Number of Children: N/A     Years of Education: N/A     Occupational History     Not on file.     Social History Main Topics     Smoking status: Current Every Day Smoker -- 0.25 packs/day     Smokeless tobacco: Never Used     Alcohol Use: Yes     Drug Use: No     Sexual Activity: Not on file     Other Topics Concern     Not on file     Social History Narrative            Family History:   No family history on file.  No family history of HTN, early heart disease, DM, pancreatitis, or CA.          Immunizations:     Immunization History   Administered Date(s) Administered     TDAP (ADACEL AGES 11-64) 07/13/2015              Allergies:    reviewed  Allergies   Allergen Reactions     Naproxen Rash            Medications:     Prior to Admission Medications   Prescriptions Last Dose Informant Patient Reported? Taking?   FLUOXETINE HCL PO 2/4/2017 at Unknown time Self Yes Yes   Sig: Take 20 mg by mouth daily    PROPRANOLOL HCL PO 02/04/2017 Self Yes No   Sig: Take 10 mg by mouth 3 times daily   hydrOXYzine (ATARAX) 50 MG tablet More than a month at Unknown time Self No No   Sig: Take 1 tablet (50 mg) by mouth every 6 hours as needed for itching      Facility-Administered Medications: None             Review of Systems:     A complete ROS was performed and is negative other than what is stated in the HPI.          Physical Exam:   Blood pressure 159/116, temperature 96.4  F (35.8  C), temperature source  Oral, resp. rate 16, SpO2 94 %.  General: slightly anxious, diaphoretic  HEENT: PERRL, EOMI, no scleral icterus, slightly dry MM  Neck: supple, no LAD  Chest/Resp: CTA b/l, normal work of breathing  Heart/CV: tachycardic, regula, S1/S2, no m,r,g  Abdomen/GI: conscious guarding around the epigastrium, diffusely tender to mild palpation and percussion, no rebound, active bowel sounds  Extremities/MSK: 2+ pulses, no edema  Skin: no rashes  Neuro: CN II-XII grossly intact, no focal deficits, slightly tremulous, no asterixis  Psych: A&Ox3    Tubes/Lines/Devices:             Data:   Reviewed OSH records, including labs and imaging reports, labs here are pending.     Francis Fonseca

## 2017-02-07 NOTE — IP AVS SNAPSHOT
Isaiah Ramos #[id number] (CSN: [id number])  (36 year old M)  (Adm: 17)     [id number]               UNIT 7D [facility]: [phone]            Patient Demographics     Patient Name Sex          Age SSN Address Phone    Isaiah Ramos Male 1980 (36 year old) [id number]  [facility] [id number] [phone] (Home)  [phone] (Mobile)      Emergency Contact(s)     Name Relation Home Work Mobile    Jane Lackey Significant other [phone]      Admission Information     Attending Provider Admitting Provider Admission Type Admission Date/Time    Bernardo Carrillo [place] [facility] [place] Ngo MD Urgent 17  0434    Discharge Date Hospital Service Auth/Cert Status Service Area     Internal Medicine [facility]    Unit Room/Bed Admission Status    U U7D 7521/7521-01 Admission (Confirmed)            Admission     Complaint    Necrotizing pancreatitis, Splenic vein thrombosis, Acute necrotizing pancreatitis, GI bleed      Hospital Account     Name Acct ID Class Status Primary Coverage    Isaiah Ramos [id number] Inpatient [facility]            Guarantor Account (for Hospital Account #[id number])     Name Relation to Pt Service Area Active? Acct Type    Isaiah Ramos Self FCS Yes Personal/Family    Address Phone          [address]  [place], [place] [address] [phone](H)              Coverage Information (for Hospital Account #[id number])     F/O Payor/Plan Precert #    [facility]     Subscriber Subscriber #    Isaiah Ramos [id number]    Address Phone    [address]  [address]  [place], [place] [address] [phone]                                                INTERAGENCY TRANSFER FORM - PHYSICIAN ORDERS   2017                       UNIT 7D [facility]: [phone]        "     Attending Provider: Bernardo Carrillo MD     Allergies:  Naproxen    Infection:  None   Service:  INTERNAL MED    Ht:  1.778 m (5' 10\")   Wt:  101.515 kg (223 lb 12.8 oz)   Admission Wt:  101.515 kg (223 lb 12.8 oz)    BMI:  32.11 kg/m 2   BSA:  2.24 m 2            ED Clinical Impression     Diagnosis Description Comment Added By Time Added    Alcohol dependence with withdrawal with complication (H) [F10.239] Alcohol dependence with withdrawal with complication (H) [F10.239]  Michelle Jaramillo NP 2/7/2017  3:43 PM    Alcoholic gastritis with hemorrhage, unspecified chronicity [K29.21] Alcoholic gastritis with hemorrhage, unspecified chronicity [K29.21]  Michelle Jaramillo NP 2/7/2017  3:46 PM    Acute necrotizing pancreatitis [K85.91] Acute necrotizing pancreatitis [K85.91]  Michelle Jaramillo NP 2/7/2017  3:46 PM    Esophageal candidiasis (H) [B37.81] Esophageal candidiasis (H) [B37.81]  Michelle Jaramillo NP 2/7/2017  4:16 PM    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 [I82.890]  Michelle Jaramillo NP 2/7/2017  7:01 PM      Hospital Problems as of 2/7/2017              Priority Class Noted POA    Acute necrotizing pancreatitis Medium  2/7/2017 Yes      Non-Hospital Problems as of 2/7/2017     None      Code Status History     Date Active Date Inactive Code Status Order ID Comments User Context    2/7/2017  4:19 PM  Full Code [id number]  Michelle Jaramillo NP Outpatient    2/7/2017  5:30 AM 2/7/2017  4:19 PM Full Code [id number]  Francis Fonseca MD Inpatient      Current Code Status     Date Active Code Status Order ID Comments User Context       Prior      Summary of Visit     Reason for your hospital stay       You were admitted to the hospital for pancreatitis and possible GI bleeding.  You received IVF and underwent an EGD to rule out a GI bleed.                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   Used for:  Esophageal candidiasis (H)        Dose:  200 mg   Start taking on:  2/8/2017   Inject 100 mLs (200 mg) into the vein every 24 hours   Quantity:  3000 mL   Refills:  0        heparin (porcine) 100-0.45 UNIT/ML-% infusion   Used for:  Splenic vein thrombosis        Dose:  0-3500 Units/hr   Inject 0-3,500 Units/hr into the vein continuous   Refills:  0        lactated ringers injection   Used for:  Acute necrotizing pancreatitis        Dose:  200 mL/hr   Inject 200 mL/hr into the vein continuous   Quantity:  1000 mL   Refills:  0        LORazepam 1 MG tablet   Commonly known as:  ATIVAN   Used for:  Alcohol dependence with withdrawal with complication (H)        For Score   greater than or equal to  20-give 2-4 mg-repeat assessment/vitals in 30 min. For Score 15-19-give 1-2 mg & repeat assessment/vitals in 1 hr For Score 8-14- give 1-2 mg & repeat assessment/vitals in 2-4 hrs. For Score less than 8-do not give & repeat assessment/vitals in 4 hrs. For Score less than 8 x 2 do not give &repeat assessment/vitals in 8 hrs.   Quantity:  60 tablet   Refills:  0        * ondansetron 2 MG/ML Soln injection   Commonly known as:  ZOFRAN   Used for:  Acute necrotizing pancreatitis        Dose:  4 mg   Inject 2 mLs (4 mg) into the vein every 6 hours as needed for nausea or vomiting   Quantity:  15 mL   Refills:  0        * ondansetron 2 MG/ML Soln injection   Commonly known as:  ZOFRAN   Used for:  Alcohol dependence with withdrawal with complication (H)        Dose:  4 mg   Inject 2 mLs (4 mg) into the vein every 30 minutes as needed for nausea or vomiting   Quantity:  15 mL   Refills:  0        pantoprazole 40 MG injection   Commonly known as:  PROTONIX   Used for:  Alcoholic gastritis with hemorrhage, unspecified chronicity        Dose:  40 mg   Inject 40 mg into the vein 2 times daily   Quantity:  30 each   Refills:  0        prochlorperazine 5 MG/ML  injection   Commonly known as:  COMPAZINE   Used for:  Acute necrotizing pancreatitis        Dose:  5-10 mg   Inject 1-2 mLs (5-10 mg) into the vein every 6 hours as needed for nausea or vomiting   Quantity:  120 mL   Refills:  0        * Notice:  This list has 2 medication(s) that are the same as other medications prescribed for you. Read the directions carefully, and ask your doctor or other care provider to review them with you.      CONTINUE these medications which may have CHANGED, or have new prescriptions. If we are uncertain of the size of tablets/capsules you have at home, strength may be listed as something that might have changed.        Dose / Directions Comments    FLUoxetine 20 MG capsule   Commonly known as:  PROzac   This may have changed:  medication strength   Used for:  Alcohol dependence with withdrawal with complication (H)        Dose:  20 mg   Take 1 capsule (20 mg) by mouth daily   Quantity:  30 capsule   Refills:  0          CONTINUE these medications which have NOT CHANGED        Dose / Directions Comments    propranolol 10 MG tablet   Commonly known as:  INDERAL   Used for:  Alcohol dependence with withdrawal with complication (H)        Dose:  10 mg   Take 1 tablet (10 mg) by mouth 3 times daily   Quantity:  90 tablet   Refills:  0          STOP taking     hydrOXYzine 50 MG tablet   Commonly known as:  ATARAX                   After Care     Activity - Up ad osman           Daily weights       Call Provider for weight gain of more than 2 pounds per day or 5 pounds per week.       Fall precautions           IV access       Peripheral IV.       Intake and output       Every shift       NPO                 Follow-Up Appointment Instructions     Follow Up and recommended labs and tests       Follow up with [facility] admitting MD upon arrival.  Recommended labs: CBC, CMP, lipase daily.  Hgb q 6 hours. INR daily for MELD calculation.             Statement of Approval     Ordered          02/07/17 1904  " I have reviewed and agree with all the recommendations and orders detailed in this document.   EFFECTIVE NOW     Approved and electronically signed by:  Michelle Jaramillo NP                                                 INTERAGENCY TRANSFER FORM - NURSING   2/7/2017                       UNIT [facility]D [facility]: [phone]            Attending Provider: Bernardo Carrillo MD     Allergies:  Naproxen    Infection:  None   Service:  INTERNAL MED    Ht:  1.778 m (5' 10\")   Wt:  101.515 kg (223 lb 12.8 oz)   Admission Wt:  101.515 kg (223 lb 12.8 oz)    BMI:  32.11 kg/m 2   BSA:  2.24 m 2            Advance Directives        Does patient have a scanned Advance Directive/ACP document in EPIC?           No        Immunizations     Name Date      Influenza Vaccine IM 3yrs+ 4 Valent IIV4 defer-02/08/17     Deferral:       Influenza Vaccine IM 3yrs+ 4 Valent IIV4 defer-02/07/17     Deferral:       Pneumococcal 23 valent defer-02/08/17     Deferral:       Pneumococcal 23 valent defer-02/07/17     Deferral:       TDAP (ADACEL AGES 11-64) 07/13/15       ASSESSMENT     Discharge Profile Flowsheet     GASTROINTESTINAL (ADULT,PEDIATRIC,OB)     SKIN      GI WDL  ex 02/07/17 1915   Inspection  Full 02/07/17 1915    Last Bowel Movement  02/07/17 02/07/17 1915   Skin WDL  ex 02/07/17 1915    GI Signs/Symptoms  abdominal pain 02/07/17 1915   Skin Integrity  tattoo(s) 02/07/17 1915    COMMUNICATION ASSESSMENT     SAFETY      Patient's communication style  spoken language (English or Bilingual) 02/07/17 0456   Safety WDL  WDL 02/07/17 1915                 Assessment WDL (Within Defined Limits) Definitions           Safety WDL     Effective: 09/28/15    Row Information: <b>WDL Definition:</b> Bed in low position, wheels locked; call light in reach; upper side rails up x 2; ID band on<br> <font color=\"gray\"><i>Item=AS safety wdl>>List=AS safety wdl>>Version=F14</i></font>      Skin WDL     Effective: 09/28/15    Row Information: <b>WDL " "Definition:</b> Warm; dry; intact; elastic; without discoloration; pressure points without redness<br> <font color=\"gray\"><i>Item=AS skin wdl>>List=AS skin wdl>>Version=F14</i></font>      Vitals     Vital Signs Flowsheet     VITAL SIGNS     Response to Interventions  Decrease in pain 02/07/17 1245    Temp  100.3  F (37.9  C) 02/07/17 1905   CLINICALLY ALIGNED PAIN ASSESSMENT (CAPA) ([facility], [facility] AND [facility] ADULTS ONLY)      Temp src  Oral 02/07/17 1905   Comfort  intolerable 02/07/17 1751    Resp  20 02/07/17 1905   Change in Pain  about the same 02/07/17 1513    Pulse  100 02/07/17 1402   Pain Control  inadequate pain control 02/07/17 0445    Heart Rate  121 02/07/17 1905   Functioning  pain keeps me from doing most of what I need to do 02/07/17 1751    Pulse/Heart Rate Source  Monitor 02/07/17 1905   Sleep  awake with occasional pain 02/07/17 1722    BP  (!) 172/124 mmHg 02/07/17 1905   HEIGHT AND WEIGHT      BP Location  Right arm 02/07/17 1905   Height  1.778 m (5' 10\") 02/07/17 1720    Patient Position  Lying 02/07/17 1402   Weight  101.515 kg (223 lb 12.8 oz) 02/07/17 1720    OXYGEN THERAPY     BSA (Calculated - sq m)  2.24 02/07/17 1720    SpO2  94 % 02/07/17 1905   BMI (Calculated)  32.18 02/07/17 1720    O2 Device  None (Room air) 02/07/17 1905   POSITIONING      Oxygen Delivery  4 LPM 02/07/17 1245   Body Position  independently positioning 02/07/17 1915    PAIN/COMFORT     Head of Bed (HOB)  HOB at 20-30 degrees 02/07/17 1915    Patient Currently in Pain  yes 02/07/17 1751   DAILY CARE      Preferred Pain Scale  word (verbal rating pain scale) 02/07/17 1245   Activity Type  activity adjusted per tolerance 02/07/17 1200    Pain Location  Abdomen 02/07/17 1722   Activity Level of Assistance  assistance, stand-by 02/07/17 1200    Pain Orientation  Mid 02/07/17 1245   ECG      Pain Descriptors  Sharp 02/07/17 1722   ECG Rhythm  Normal sinus rhythm 02/07/17 1245    Pain Management Interventions  analgesia " administered 02/07/17 1722   Ectopy  None 02/07/17 1245    Pain Intervention(s)  Heat applied;MD notified (Comment) 02/07/17 1751                 Patient Lines/Drains/Airways Status    Active LINES/DRAINS/AIRWAYS     Name: Placement date: Placement time: Site: Days: Last dressing change:    Peripheral IV 02/07/17 Anterior;Left 02/07/17      less than 1     Peripheral IV 02/07/17 Left;Lateral Lower forearm 02/07/17  0805  Lower forearm  less than 1     Peripheral IV 02/07/17 Right;Anterior Lower forearm 02/07/17  0914  Lower forearm  less than 1             Patient Lines/Drains/Airways Status    Active PICC/CVC     **None**            Intake/Output Detail Report     Date Intake     Output   Net    Shift P.O. I.V. IV Piggyback Total Urine Blood Total       Carlota 02/06/17 0700 - 02/06/17 1459 -- -- -- -- -- -- -- 0    Noc 02/06/17 1500 - 02/06/17 2359 -- -- -- -- -- -- -- 0    Day 02/07/17 0000 - 02/07/17 0659 -- -- -- -- -- -- -- 0    Carlota 02/07/17 0700 - 02/07/17 1459 0 500 -- 500 0 0 -- 500    Noc 02/07/17 1500 - 02/07/17 2359 -- 200 -- 200 -- -- -- 200      Last Void/BM       Most Recent Value    Urine Occurrence 1 at 02/07/2017 0845    Stool Occurrence       Case Management/Discharge Planning     Case Management/Discharge Planning Flowsheet     REFERRAL INFORMATION     ABUSE RISK SCREEN      Arrived From  another healthcare institution, not defined;admitted as an inpatient 02/07/17 0456   QUESTION TO PATIENT:  Has a member of your family or a partner(now or in the past) intimidated, hurt, manipulated, or controlled you in any way?  no 02/07/17 0456    LIVING ENVIRONMENT     QUESTION TO PATIENT: Do you feel safe going back to the place where you are living?  yes 02/07/17 0456    Lives With  spouse;child(emanuel), dependent 02/07/17 0456   OBSERVATION: Is there reason to believe there has been maltreatment of a vulnerable adult (ie. Physical/Sexual/Emotional abuse, self neglect, lack of adequate food, shelter, medical  care, or financial exploitation)?  no 02/07/17 0456    COPING/STRESS     (R) MENTAL HEALTH SUICIDE RISK      Major Change/Loss/Stressor  death of a loved one;relocation 02/07/17 0456   Are you depressed or being treated for depression?  Yes 02/07/17 0456                  UNIT 7D [facility]: [phone]            Medication Administration Report for Isaiah Ramos as of 02/07/17 1938   Legend:    Given Hold Not Given Due Canceled Entry Other Actions    Time Time (Time) Time  Time-Action       Inactive    Active    Linked        Medications 02/01/17 02/02/17 02/03/17 02/04/17 02/05/17 02/06/17 02/07/17    0.9% sodium chloride BOLUS  Dose: 1,000 mL Freq: ONCE Route: IV  Start: 02/07/17 1900          [ ] 1900           FLUoxetine (PROzac) capsule 20 mg  Dose: 20 mg Freq: DAILY Route: PO  Start: 02/07/17 1915          [ ] 1915           heparin  drip 25,000 units in 0.45% NaCl 250 mL (see additional administration details for dose)  Rate: 0-35 mL/hr Freq: CONTINUOUS Route: IV  Last Dose: 1,800 Units/hr (02/07/17 1753)  Start: 02/07/17 1745   Admin Instructions: Starting infusion Rate = 1800 units/hr    High Intensity Heparin Treatment.  GOAL: Heparin Xa (10a) LEVEL = 0.30-0.70 IU/mL (PTT =  seconds).  Max 1000 units/hr if patient getting tenecteplase (TNKase) and greater than 70 kg.  Beginning with the Heparin Xa (10a) Level collected 6 hours after starting heparin, adjust heparin according to guidelines.    Release Heparin Xa (10a) Level order for blood draw 6 hours after start of infusion and 6 hours after any dose change.    If Xa (10a) less than 0.1 see bolus orders and INCREASE by 400 units/hr.    If Xa (10a) 0.1 - 0.17 see bolus orders and INCREASE by 300 units/hr.    If Xa (10a) 0.18 - 0.29 see bolus orders and INCREASE by 150 units/hr.    If Xa (10a) 0.3 - 0.7 then NO CHANGE in rate .    If Xa (10a) 0.71 - 1.01 then HOLD 30 min and Reduce by 100 units/hr.    If Xa (10a) 1.02 - 2  then HOLD 60  min and Reduce by 150 units/hr     If Xa (10a) greater than 2 then HOLD 60 min and Reduce by 200 units/hr .           1753 (1,800 Units/hr)-New Bag           heparin bolus from infusion pump  Freq: EVERY 6 HOURS PRN Route: IV  PRN Comment: GOAL: Heparin Xa (10a) LEVEL = 0.30-0.70 IU/mL (PTT =  seconds).  Start: 02/07/17 1730   Admin Instructions: Beginning with the Heparin Xa (10a) level collected 6 hours AFTER starting heparin:  If Heparin Xa (10a) less than  0.1, then bolus dose = 6000 units      If Heparin Xa (10a) 0.1 to 0.17, then bolus dose = 4000 units    If Heparin Xa (10a) 0.18 to 0.29, then bolus dose = 3000 units  Nurse to administer dose from existing infusion. If no infusion bag or syringe for this order is available, contact pharmacist to re-enter medication order.               HYDROmorphone (PF) (DILAUDID) injection 0.5-1 mg  Dose: 0.5-1 mg Freq: EVERY 2 HOURS PRN Route: IV  PRN Reason: moderate to severe pain  Start: 02/07/17 0532          0604 (0.5 mg)-Given       0902 (1 mg)-Given       1230 (1 mg)-Given       1446 (1 mg)-Given       1723 (1 mg)-Given             Dose: 1,000 mL Freq: ONCE Route: IV  Start: 02/07/17 1900   End: 02/07/17 1856          1856-Med Discontinued       lactated ringers infusion  Rate: 200 mL/hr Freq: CONTINUOUS Route: IV  Start: 02/07/17 1600          1732 ( )-New Bag           LORazepam (ATIVAN) tablet 1-4 mg  Dose: 1-4 mg Freq: EVERY 30 MIN PRN Route: PO  PRN Reason: other  PRN Comment: Dose according to patient's MSSA Score (see admin instructions)  Start: 02/07/17 0531   Admin Instructions: Dosing:  * For Score   greater than or equal to  20......give 2-4 mg.........repeat assessment/vitals in 30 min.  * For Score                                          15-19..... give 1-2 mg.........repeat assessment/vitals in 1 hr  * For Score                                            8-14..... give 1-2 mg........repeat assessment/vitals in 2-4 hrs.  * For Score                "                  less than 8...........do not give..........repeat assessment/vitals in 4 hrs.  * For Score                                 less than 8 x 2 do not give..........repeat assessment/vitals in 8 hrs.   If more than 10 mg of lorazepam is used within 24 hrs and MSSA less than 8, Contact provider or pharmacist to enter order to place  patient on tapering lorazepam schedule, reducing each day's dose by 50%.   NOTIFY provider and HOLD lorazepam IF: *SBP less than 100 mmHg, RR less than12; *Shallow respirations or signs of upper airway obstruction; *Patient not easily arousable; *\" If MSSA greater than 20 and patient not responding to lorazepam, contact provider and request a change to diazepam PO           0638 (1 mg)-Given       1805 (2 mg)-Given           magnesium sulfate 4 g in 100 mL sterile water (premade)  Dose: 4 g Freq: EVERY 4 HOURS PRN Route: IV  PRN Reason: magnesium supplementation  Start: 02/07/17 0812   Admin Instructions: For serum Mg++ less than 1.6 mg/dL  Give 4 g and recheck magnesium level 2 hours after dose, and next AM.               meperidine (DEMEROL) injection 12.5 mg  Dose: 12.5 mg Freq: EVERY 15 MIN PRN Route: IV  PRN Reason: post anesthesia shivering  Start: 02/07/17 1420   End: 02/07/17 2100          2100-Med Discontinued       naloxone (NARCAN) injection 0.1-0.4 mg  Dose: 0.1-0.4 mg Freq: EVERY 2 MIN PRN Route: IV  PRN Reason: opioid reversal  Start: 02/07/17 1420   End: 02/08/17 1419   Admin Instructions: For apnea or imminent respiratory arrest: give 0.4 mg IV undiluted Q 2 minutes PRN until desired degree of reversal is obtained, stop opioid and notify provider. Continue monitoring until discharge are criteria met for a minimum of 2 hours.  For severe sedation, decrease in respiratory depth, quality or Respiratory Rate greater than 8: give 0.1 mg IV Q 2 minutes x 3 doses, stop opioid and notify provider.  Try to minimize reversal of analgesia especially in end-of-life " patients.  Continue monitoring until discharge criteria are met for a minimum of 2 hours.               naloxone (NARCAN) injection 0.1-0.4 mg  Dose: 0.1-0.4 mg Freq: EVERY 2 MIN PRN Route: IV  PRN Reason: opioid reversal  Start: 02/07/17 0529   Admin Instructions: For respiratory rate LESS than or EQUAL to 8.  Partial reversal dose:  0.1 mg titrated q 2 minutes for Analgesia Side Effects Monitoring Sedation Level of 3 (frequently drowsy, arousable, drifts to sleep during conversation).Full reversal dose:  0.4 mg bolus for Analgesia Side Effects Monitoring Sedation Level of 4 (somnolent, minimal or no response to stimulation).               ondansetron (ZOFRAN-ODT) ODT tab 4 mg  Dose: 4 mg Freq: EVERY 30 MIN PRN Route: PO  PRN Reasons: nausea,vomiting  Start: 02/07/17 1420   End: 02/07/17 2000   Admin Instructions: MAX total dose = 8 mg, including OR dosing. This is step 1 of the nausea and vomiting protocol.  If not resolved in 15 minutes, then go to step 2 (Prochlorperazine if ordered).           2000-Med Discontinued      Or  ondansetron (ZOFRAN) injection 4 mg  Dose: 4 mg Freq: EVERY 30 MIN PRN Route: IV  PRN Reasons: nausea,vomiting  Start: 02/07/17 1420   End: 02/07/17 2000   Admin Instructions: MAX total dose = 8 mg, including OR dosing. This is step 1 of the nausea and vomiting protocol.  If not resolved in 15 minutes, then go to step 2 (Prochlorperazine if ordered).           2000-Med Discontinued       ondansetron (ZOFRAN-ODT) ODT tab 4 mg  Dose: 4 mg Freq: EVERY 6 HOURS PRN Route: PO  PRN Reason: nausea  Start: 02/07/17 0530   Admin Instructions: This is Step 1 of nausea and vomiting management.  If nausea not resolved in 15 minutes, go to Step 2 prochlorperazine (COMPAZINE). Do not push through foil backing. Peel back foil and gently remove. Place on tongue immediately. Administration with liquid unnecessary                     Or  ondansetron (ZOFRAN) injection 4 mg  Dose: 4 mg Freq: EVERY 6 HOURS PRN  Route: IV  PRN Reasons: nausea,vomiting  Start: 02/07/17 0530   Admin Instructions: This is Step 1 of nausea and vomiting management.  If nausea not resolved in 15 minutes, go to Step 2 prochlorperazine (COMPAZINE).           0604 (4 mg)-Given           pantoprazole (PROTONIX) 40 mg IV push injection  Dose: 40 mg Freq: 2 TIMES DAILY Route: IV  Start: 02/07/17 2000   Admin Instructions: Reconstitute vial with 10mLs Saline and administer IV Push           [ ] 2000           potassium chloride (KLOR-CON) Packet 20-40 mEq  Dose: 20-40 mEq Freq: EVERY 2 HOURS PRN Route: ORAL OR FEED  PRN Reason: potassium supplementation  Start: 02/07/17 0812   Admin Instructions: Use if unable to tolerate tablets.  If Serum K+ 3.0-3.3, dose = 60 mEq po total dose (40 mEq x1 followed in 2 hours by 20 mEq x1). Recheck K+ level 4 hours after dose and the next AM.  If Serum K+ 2.5-2.9, dose = 80 mEq po total dose (40 mEq Q2H x2). Recheck K+ level 4 hours after dose and the next AM.  If Serum K+ less than 2.5, See IV order.  Dissolve packet contents in 4-8 ounces of cold water or juice.               potassium chloride 10 mEq in 100 mL intermittent infusion  Dose: 10 mEq Freq: EVERY 1 HOUR PRN Route: IV  PRN Reason: potassium supplementation  Start: 02/07/17 0812   Admin Instructions: Infuse via PERIPHERAL LINE or CENTRAL LINE. Use for central line replacement if patient weight less than 65 kg, if patient is on TPN with high potassium content or if unit does not stock 20 mEq bags.   If Serum K+ 3.0-3.3, dose = 10 mEq/hr x4 doses (40 mEq IV total dose). Recheck K+ level 2 hours after dose and the next AM.   If Serum K+ less than 3.0, dose = 10 mEq/hr x6 doses (60 mEq IV total dose). Recheck K+ level 2 hours after dose and the next AM.               potassium chloride 10 mEq in 100 mL intermittent infusion with 10 mg lidocaine  Dose: 10 mEq Freq: EVERY 1 HOUR PRN Route: IV  PRN Reason: potassium supplementation  Start: 02/07/17 0812   Admin  Instructions: Infuse via PERIPHERAL LINE. Use potassium with lidocaine for pain with peripheral administration.  If Serum K+ 3.0-3.3, dose = 10 mEq/hr x4 doses (40 mEq IV total dose). Recheck K+ level 2 hours after dose and the next AM.  If Serum K+ less than 3.0, dose = 10 mEq/hr x6 doses (60 mEq IV total dose). Recheck K+ level 2 hours after dose and the next AM.               potassium chloride 20 mEq in 50 mL intermittent infusion  Dose: 20 mEq Freq: EVERY 1 HOUR PRN Route: IV  PRN Reason: potassium supplementation  Start: 02/07/17 0812   Admin Instructions: Infuse via CENTRAL LINE Only. May need EKG if less than 65 kg or on TPN - Max rate is 0.3 mEq/kg/hr for patients not on EKG monitoring.   If Serum K+ 3.0-3.3, dose = 20 mEq/hr x2 doses (40 mEq IV total dose). Recheck K+ level 2 hours after dose and the next AM.  If Serum K+ less than 3.0, dose = 20 mEq/hr x3 doses (60 mEq IV total dose). Recheck K+ level 2 hours after dose and the next AM.               potassium chloride SA (K-DUR/KLOR-CON M) CR tablet 20-40 mEq  Dose: 20-40 mEq Freq: EVERY 2 HOURS PRN Route: PO  PRN Reason: potassium supplementation  Start: 02/07/17 0812   Admin Instructions: Use if able to take PO.   If Serum K+ 3.0-3.3, dose = 60 mEq po total dose (40 mEq x1 followed in 2 hours by 20 mEq x1). Recheck K+ level 4 hours after dose and the next AM.  If Serum K+ 2.5-2.9, dose = 80 mEq po total dose (40 mEq Q2H x2). Recheck K+ level 4 hours after dose and the next AM.  If Serum K+ less than 2.5, See IV order.  DO NOT CRUSH.                 Dose: 5-10 mg Freq: EVERY 6 HOURS PRN Route: IV  PRN Reasons: nausea,vomiting  Start: 02/07/17 1420   End: 02/07/17 1919   Admin Instructions: This is Step 2 of the nausea and vomiting protocol.   If nausea not resolved in 15 minutes, give Metoclopramide if ordered (step 3 of nausea and vomiting protocol)           1919-Med Discontinued       propranolol (INDERAL) tablet 10 mg  Dose: 10 mg Freq: 3 TIMES  DAILY Route: PO  Start: 02/07/17 2000          [ ] 2000           sodium phosphate 15 mmol in D5W intermittent infusion  Dose: 15 mmol Freq: DAILY PRN Route: IV  PRN Reason: phosphorous supplementation  Start: 02/07/17 0813   Admin Instructions: For serum phosphorus level 2-2.4  Do not infuse Phosphorus in the same line as TPN.   Give 15 mmol and recheck phosphorus level next AM.               sodium phosphate 20 mmol in D5W intermittent infusion  Dose: 20 mmol Freq: EVERY 6 HOURS PRN Route: IV  PRN Reason: phosphorous supplementation  Start: 02/07/17 0813   Admin Instructions: For serum phosphorus level 1.1-1.9  Do not infuse Phosphorus in the same line as TPN.   Give 20 mmol and recheck phosphorus level 2 hours after last dose and next AM.               sodium phosphate 25 mmol in D5W intermittent infusion  Dose: 25 mmol Freq: EVERY 8 HOURS PRN Route: IV  PRN Reason: phosphorous supplementation  Start: 02/07/17 0813   Admin Instructions: For serum phosphorus level less than 1.1  Do not infuse Phosphorus in the same line as TPN.   Give 25 mmol and recheck phosphorus level 2 hours after last dose and next AM.              Future Medications  Medications 02/01/17 02/02/17 02/03/17 02/04/17 02/05/17 02/06/17 02/07/17       fluconazole (DIFLUCAN) intermittent infusion 200 mg in NaCl  Dose: 200 mg Freq: EVERY 24 HOURS Route: IV  Indications of Use: CANDIDIASIS  Start: 02/08/17 0800   End: 02/22/17 0759              influenza quadrivalent (PF) vacc age 3 yrs and older (FLUZONE or Flulaval) injection 0.5 mL  Dose: 0.5 mL Freq: PRIOR TO DISCHARGE Route: IM  Start: 02/08/17 1000   Admin Instructions: Administer when afebrile (less than 100.4 F) x 24 hours, or Prior to Discharge. If not administering when scheduled , change the due time by following the instructions in the reference link below. If patient refuses vaccine, chart as Vaccine Refused.           (1936)-Vaccine Refused           pneumococcal vaccine (PNEUMOVAX  23-carlos) injection 0.5 mL  Dose: 0.5 mL Freq: PRIOR TO DISCHARGE Route: IM  Start: 02/08/17 1000   Admin Instructions: Administer when afebrile (less than 100.4 ) x 24 hr OR prior to discharge. *Give Fact Sheet to Patient*. If patient is febrile, reassess in 8 hrs or every shift. Minor illnesses with or without fever does NOT contraindicate the vaccination. If not administering when scheduled , change the due time by following the instructions in the reference link below. If patient refuses vaccine, chart as Vaccine Refused.           (1937)-Vaccine Refused          Completed Medications  Medications 02/01/17 02/02/17 02/03/17 02/04/17 02/05/17 02/06/17 02/07/17         Dose: 400 mg Freq: ONCE Route: IV  Indications of Use: CANDIDIASIS  Start: 02/07/17 1630   End: 02/07/17 1832          1732 (400 mg)-New Bag             Freq: ONCE Route: IV  Last Dose: Stopped (02/07/17 1230)  Start: 02/07/17 0545   End: 02/07/17 1230          0845 ( )-New Bag       1230-Stopped [C]                    Dose: 1,000 mL Freq: ONCE Route: IV  Last Dose: 1,000 mL (02/07/17 0639)  Start: 02/07/17 0630   End: 02/07/17 0739          0639 (1,000 mL)-New Bag             Start: 02/07/17 1205   End: 02/07/17 1230   Admin Instructions: BELKIS DE LA PAZ: cabinet override           1230 ( )-New Bag             Dose: 80 mg Freq: ONCE Route: IV  Start: 02/07/17 0615   End: 02/07/17 0640   Admin Instructions: Reconstitute vial with 10mLs Saline and administer IV Push           0638 (80 mg)-Given          Discontinued Medications  Medications 02/01/17 02/02/17 02/03/17 02/04/17 02/05/17 02/06/17 02/07/17         Dose: 1,000 mL Freq: ONCE Route: IV  Start: 02/07/17 0630   End: 02/07/17 0620          0620-Med Discontinued         Dose: 1 g Freq: EVERY 24 HOURS Route: IV  Indications of Use: INTRA-ABDOMINAL INFECTION  Start: 02/07/17 0830   End: 02/07/17 1704          1230 (1 g)-New Bag       1704-Med Discontinued         Rate: 0-35 mL/hr Freq: CONTINUOUS  Route: IV  Start: 02/07/17 1715   End: 02/07/17 1731   Admin Instructions: Starting Infusion Rate = Patient weight not available. (18 units/kg/hr).  High Intensity Heparin Treatment.  GOAL: Heparin Xa (10a) LEVEL = 0.30-0.70 IU/mL (PTT =  seconds).  Max 1000 units/hr if patient getting tenecteplase (TNKase) and greater than 70 kg.  Beginning with the Heparin Xa (10a) Level collected 6 hours after starting heparin, adjust heparin according to guidelines.    Release Heparin Xa (10a) Level order for blood draw 6 hours after start of infusion and 6 hours after any dose change.    If Xa (10a) less than 0.1 see bolus orders and INCREASE by 400 units/hr.    If Xa (10a) 0.1 - 0.17 see bolus orders and INCREASE by 300 units/hr.    If Xa (10a) 0.18 - 0.29 see bolus orders and INCREASE by 150 units/hr.    If Xa (10a) 0.3 - 0.7 then NO CHANGE in rate .    If Xa (10a) 0.71 - 1.01 then HOLD 30 min and Reduce by 100 units/hr.    If Xa (10a) 1.02 - 2 then HOLD 60 min and Reduce by 150 units/hr     If Xa (10a) greater than 2  then HOLD 60 min and Reduce by 200 units/hr .           1731-Med Discontinued               Freq: EVERY 6 HOURS PRN Route: IV  PRN Comment: GOAL: Heparin Xa (10a) LEVEL = 0.30-0.70 IU/mL (PTT =  seconds).  Start: 02/07/17 1704   End: 02/07/17 1731   Admin Instructions: Beginning with the Heparin Xa (10a) level collected 6 hours AFTER starting heparin:  If Heparin Xa (10a) less than  0.1, then bolus dose = Patient weight not available. (60 Units/kg x Patient weight not available.)    If Heparin Xa (10a) 0.1 to 0.17, then bolus dose = Patient weight not available. (40 Units/kg x Patient weight not available.)    If Heparin Xa (10a) 0.18 to 0.29, then bolus dose = Patient weight not available. (30 Units/kg x Patient weight not available.).  Nurse to administer dose from existing infusion. If no infusion bag or syringe for this order is available, contact pharmacist to re-enter medication order.            1731-Med Discontinued         Freq: ONCE Route: IV  Start: 02/07/17 1600   End: 02/07/17 1548          1548-Med Discontinued         Rate: 100 mL/hr Freq: CONTINUOUS Route: IV  Start: 02/07/17 1545   End: 02/07/17 1548   Admin Instructions: Continue until IV catheter is weaned           1548-Med Discontinued               Rate: 10 mL/hr Freq: CONTINUOUS Route: IV  Last Dose: Stopped (02/07/17 1448)  Start: 02/07/17 0615   End: 02/07/17 1437          1037 (50 mcg/hr)-New Bag       1437-Med Discontinued  1448-Stopped             Dose: 40 mg Freq: 2 TIMES DAILY Route: PO  Start: 02/07/17 2000   End: 02/07/17 1440          1440-Med Discontinued         Rate: 10 mL/hr Freq: CONTINUOUS Route: IV  Last Dose: Stopped (02/07/17 1449)  Start: 02/07/17 0615   End: 02/07/17 1437          1029 (8 mg/hr)-New Bag       1437-Med Discontinued  1449-Stopped        Medications 02/01/17 02/02/17 02/03/17 02/04/17 02/05/17 02/06/17 02/07/17               INTERAGENCY TRANSFER FORM - NOTES (H&P, Discharge Summary, Consults, Procedures, Therapies)   2/7/2017                       [address] [facility] BANK: [phone]               History & Physicals      H&P by Francis Fonseca MD at 2/7/2017  6:13 AM     Author:  Francis Fonseca MD Service:  General Medicine Author Type:  Physician    Filed:  2/7/2017  6:45 AM Note Time:  2/7/2017  6:13 AM Status:  Signed    :  Francis Fonseca MD (Physician)             [facility] Medicine History and Physical  Department of Internal Medicine    Patient Name: Isaiah Hurst MRN# [id number]   Age: 36 year old YOB: 1980     Date of Admission:2/7/2017    Primary care provider: Lore Arellano  Date of Service: 2/7/2017  Admitting Team: Atif Muse         Assessment and Plan:   Isaiah Hurst is a 36 year old male with history of ETOH abuse who presents with 3 day of diffuse abdominal pain, nausea, vomiting, coffee ground emesis and melena, and found to have  necrotizing pancreatitis.    1. Necrotizing pancreatitis: likely acute on chronic secondary to ETOH, lipase is only mildly elevated, however CT scan at the OSH showed substantial necrosis of the tail of the pancreas without signs of infection. Is significantly hemo-concentrated.  Splenic vein thrombosis suggests there may be some chronicity.    - NPO, Bolus an additional L, and start IV@ 200 cc/hr, aggressive pain control   - CT scan will be uploaded to our system in the morning   - ADAT   - trend labs    2. UGIB: 3 day history of coffee ground emesis and melena and a several month history of intermittent sharp epigastric pain that improves with eating. Likely PUD vs ETOH gastritis, however given the ETOH abuse, and possible portal vein thrombosis an variceal bleed is not out of the question.   - 2 large bore IV   - protonix and octreotide gtt   - trend hgb q6   - consult GI for a possible EGD    3. ETOH abuse: long standing, has seen a  and was preparing to fill out a Rule 25.  Drinks 1 pint to 1 L of vodka daily, and has daily withdrawal symptoms but no history of seizures   - banana bag, continue IV thiamine   - MSSA protocol (is high risk for DTs)   - CD treatment consult for when he is stable.     4. Splenic vein thrombosis/portal vein thrombosis: likely secondary to pancreatitis, CT incidentally found a splenic vein thrombosis extending to the portal vein.     - may consider anti-coagulation in the future (questionable benefit in the long run) but will hold off in the context of an upper GI bleed.     5. Hyponatremia: Na 129 at OSH, likely hypovolemic related to poor PO intake and repeated emesis vs pancreatitis   - recheck following adequate volume resuscitation    6. Transaminitis: at OSH /, likely secondary to ETOH abuse.  Bilirubin is mildly elevated at 2.3, without signs of obstruction.   - repeat abdominal U/S with dopplers      CODE: Full  Diet/IVF: NPO, banana bag at 200 cc    DVT ppx: mechanical given bleeding  Disposition/Admission Status: Inpatient    Francis Ngo   Internal Medicine Hospitalist & Staff Physician  [facility]  Pager: [phone]           Chief Complaint:   Abdominal pain         HPI:   Mr Omkar Hurst is a 36 year old male with history of ETOH abuse who presents with a 3 day history of abdominal pain.  He states he was in his usual state of health, which includes drinking approximately a liter of vodka daily and experiencing intermittent abdominal pain (though typically this improves with eating) and undergoing transient withdrawal symptoms (sweating, shakiness).  Approximately 3 days ago he noticed his abdominal pain became more generalized, with associated nausea, which was stabbing in nature and made worse by eating.  He also had multiple episodes of emesis that was coffee in color.  He also noticed multiple episodes of melena.  He states he has not experienced these symptoms in the past.  His symptoms persisted and he had been unable to tolerate much oral intake, though continued to drink, until the day of admission.  He presented to an ED in [place], [place] where they observed an elevated WBC at 19.9, Hct of 60.6, Hgb of 15.5.  Other labs were notable for a lipase of 1000.  A CT was ordered that showed signs of pancreatitis with areas of necrosis in the tail of the pancreas as well as a splenic vein thrombosis extending to the portal vein.  He was transferred here for additional management.         Past Medical History:     Past Medical History   Diagnosis Date     Hypertension      Depressive disorder      Reviewed         Past Surgical History:   No past surgical history on file.  No surgeries         Social History:   Currently single, continues to drink ETOH, 1 L of vodka a day, no other drug use.  Is interested in quitting and has seen about getting a rule 25 filled out.   Social History     Social History     Marital Status: Single     Spouse  Name: N/A     Number of Children: N/A     Years of Education: N/A     Occupational History     Not on file.     Social History Main Topics     Smoking status: Current Every Day Smoker -- 0.25 packs/day     Smokeless tobacco: Never Used     Alcohol Use: Yes     Drug Use: No     Sexual Activity: Not on file     Other Topics Concern     Not on file     Social History Narrative            Family History:   No family history on file.  No family history of HTN, early heart disease, DM, pancreatitis, or CA.          Immunizations:     Immunization History   Administered Date(s) Administered     TDAP (ADACEL AGES 11-64) 07/13/2015              Allergies:    reviewed  Allergies   Allergen Reactions     Naproxen Rash            Medications:     Prior to Admission Medications   Prescriptions Last Dose Informant Patient Reported? Taking?   FLUOXETINE HCL PO 2/4/2017 at Unknown time Self Yes Yes   Sig: Take 20 mg by mouth daily    PROPRANOLOL HCL PO 02/04/2017 Self Yes No   Sig: Take 10 mg by mouth 3 times daily   hydrOXYzine (ATARAX) 50 MG tablet More than a month at Unknown time Self No No   Sig: Take 1 tablet (50 mg) by mouth every 6 hours as needed for itching      Facility-Administered Medications: None             Review of Systems:     A complete ROS was performed and is negative other than what is stated in the HPI.          Physical Exam:   Blood pressure 159/116, temperature 96.4  F (35.8  C), temperature source Oral, resp. rate 16, SpO2 94 %.  General: slightly anxious, diaphoretic  HEENT: PERRL, EOMI, no scleral icterus, slightly dry MM  Neck: supple, no LAD  Chest/Resp: CTA b/l, normal work of breathing  Heart/CV: tachycardic, regula, S1/S2, no m,r,g  Abdomen/GI: conscious guarding around the epigastrium, diffusely tender to mild palpation and percussion, no rebound, active bowel sounds  Extremities/MSK: 2+ pulses, no edema  Skin: no rashes  Neuro: CN II-XII grossly intact, no focal deficits, slightly tremulous, no  asterixis  Psych: A&Ox3    Tubes/Lines/Devices:             Data:   Reviewed OSH records, including labs and imaging reports, labs here are pending.     Francis Fonseca                      Discharge Summaries      Discharge Summaries by Michelle Jaramillo NP at 2/7/2017  4:20 PM     Author:  Michelle Jaramillo NP Service:  Internal Medicine Author Type:  Nurse Practitioner    Filed:  2/7/2017  7:05 PM Note Time:  2/7/2017  4:20 PM Status:  Cosign Needed Addendum    :  Michelle Jaramillo NP (Nurse Practitioner)      Related Notes: Original Note by Michelle Jaramillo NP (Nurse Practitioner) filed at 2/7/2017  5:07 PM    Cosign Required:  Yes                 [facility]  Discharge Summary    Isaiah Hurst MRN# [id number]   YOB: 1980 Age: 36 year old     Date of Admission:  2/7/2017  Date of Discharge:  2/7/2017  Admitting Physician:  Francis Fonseca MD  Discharge Physician:  Bernardo Carrillo MD and Michelle Jaramillo CNP (pager [phone])  Discharging Service:  Internal Medicine     Primary Provider: Lore Arellano          Brief History of Illness:     Isaiah Hurst is a 36 year old male with history of ETOH abuse who presents with 3 day of diffuse abdominal pain, nausea, vomiting, coffee ground emesis and melena, and found to have necrotizing pancreatitis.            Admitting Hospital To Do:   1. GI Consult  2. Serial Labs as needed  3. Follow up from biopsy taken today via EGD for esophageal candidasis          Discharge Diagnosis:   1. Necrotizing Pancreatitis  2. Alcoholic Gastritis, Portal HTN  3. Esophageal Candidiasis  4. ETOH abuse and withdrawal  5. Acute Pain related to pancreatitis              Discharge Disposition:   Discharged to [facility].           Condition on Discharge:   Discharge condition: Stable to Transfer to [facility]   Code status on discharge: Full Code           Procedures:   1. EGD 2/7:   Upper GI Endoscopy      [facility]  [address] [place] [address] [phone]     Endoscopy Department   _______________________________________________________________________________   Patient Name: Isaiah Hurst       Procedure Date: 2/7/2017 1:08 PM   MRN: [id number]                       Account Number: [id number]   YOB: 1980             Admit Type: Inpatient   Age: 36                                Gender: Male   Note Status: Finalized                Attending MD: Guru Staples,   Total Sedation Time:                     _______________________________________________________________________________       Procedure:           Upper GI endoscopy   Indications:         Coffee-ground emesis, Melena                        37 yo male h/o alcohol abuse, HTN, depression, anxiety                        transferred from [place], [place] where he presented with                        worsening abdominal pain for past 3 days. Â Found to have                        pancreatitis. Also had coffee ground emesis and melena                        which resolved. No Hb drop.Of note he has never                        undergone an upper endoscopy in the past. EGD for                        variceal screening and to evaluate for causes for upper                        GI bleeding.   Providers:           Guru Staples, Robyn Drew, RN, Nadya Garcia MD   Referring MD:        Michelle Jaramillo, Bernardo Carrillo   Medicines:           Monitored Anesthesia Care   Complications:       No immediate complications.   _______________________________________________________________________________   Procedure:           Pre-Anesthesia Assessment:                        - Prior to the procedure, a History and Physical was                        performed, and patient medications, allergies and                        sensitivities were reviewed. The patient's tolerance of                         previous anesthesia was reviewed.                        - The risks and benefits of the procedure and the                        sedation options and risks were discussed with the                        patient. All questions were answered and informed                        consent was obtained.                        - Patient identification and proposed procedure were                        verified prior to the procedure by the physician and the                        nurse. The procedure was verified in the procedure room.                        - Pre-procedure physical examination revealed no                        contraindications to sedation.                        - ASA Grade Assessment: II - A patient with mild                        systemic disease.                        - Airway Examination: normal oropharyngeal airway and                        neck mobility.                        - Abdominal Examination: abdomen tender.                        - CV Examination: normal and RRR, no murmurs, no S3 or                        S4.                        After obtaining informed consent, the endoscope was                        passed under direct vision. Throughout the procedure,                        the patient's blood pressure, pulse, and oxygen                        saturations were monitored continuously. The Endoscope                        was introduced through the mouth, and advanced to the                        second part of duodenum. The upper GI endoscopy was                        accomplished with ease. The patient tolerated the                        procedure well.                                                                                     Findings:        The Z-line was regular and was found 38 cm from the incisors. Patchy        adherent curdy white exudates were present in lower third of esophagus        suspicious for candidal esophagitis. Brushing was performed to exclude         candida. No esophageal varices        Diffuse moderately erythematous mucosa without bleeding was found in the        entire examined stomach. No hematin identfied in the stomach.No gastric        varices were visualized        The examined duodenum was normal.                                                                                     Impression:          - No endoscopic evidence of active upper GI bleed.                        - No esophageal or gastric varices were identified                        - Diffusely erythrematous mucosa secondary to alcholic                        gastritis and mild portal hypertensive gastropathy was                        seen                        - Normal duodenum   Recommendation:      - Return patient to [facility] for ongoing care.                        - Clear liquid diet today as tolerated .                        - Stop octreotide, start omeprazole 20 mg BID, when he                        tolerates oral intake                        - Await pathology from esophageal brushing. Will                        empirically start fluconazole loading dose 400 mg                        followed by 200 mg/d for 14 days, stop if brushing                        negative.                        - Continue IV hydration with LR and pain control for                        pancreatitis.                        - Inpatient panc-bili consult team will follow patient                                                                                       ____________________   Guru Staples,   2/7/2017 5:27 PM     ____________________   Guru Matthewsjuvencionatalia,   2/7/2017 5:27 PM   Number of Addenda: 0     Note Initiated On: 2/7/2017 1:08 PM   Scope In: 12:00:00 AM   Scope Out: 12:00:00 AM          2. Liver US 2/7:   EXAMINATION: US ABDOMEN COMPLETE WITH DOPPLER COMPLETE, 2/7/2017 12:12  PM       COMPARISON: No similar prior. Correlation is made with outside CT abdomen dated  2/6/2017.     HISTORY: Pancreatitis, with splenic and possible portal veinthrombosis     TECHNIQUE: The abdomen was scanned in standard fashion with specialized ultrasound transducer(s) using both gray-scale, color Doppler, and spectral flow techniques.     Findings:     Exam significantly limited by fatty liver. Specifically, Doppler evaluation of hepatic vasculature is limited and likely unreliable.     Liver: The liver demonstrates increased echogenicity diffusely, resulting in significant loss of ultrasound beam penetration. No evidence of a focal hepatic mass.       Extrahepatic portal vein flow is antegrade, measuring 36 cm/sec. Right portal vein flow is antegrade, measuring 10 cm/sec. Left portal vein not visualized.     Flow in the hepatic artery is towards the liver and: 113 cm/sec peak systolic 0.45 resistive index.       The splenic vein is not seen.  The left, middle, and right hepatic veins are nonvisualized centrally, however are patent peripherally with flow towards the IVC. The visualized IVC is patent with flow towards the heart.   The aorta is not dilated.     Gallbladder: Echogenic layering sludge in the gallbladder. No evidence of discrete cholelithiasis. No gallbladder wall thickening or pericholecystic fluid. Negative sonographic Mitchell's sign.     Bile Ducts: Both the intra- and extrahepatic biliary system are of normal caliber.  The common bile duct measures 4 mm in diameter.     Pancreas: Not visualized.     Kidneys: Both kidneys are of normal echotexture, without mass or hydronephrosis.   The craniocaudal dimensions are: right- 12.8 cm,  left- 12.6 cm.     Spleen: The spleen is not visualized.       Fluid: There is trace ascites.                                                                       Impression:    1.  The vasculature seen containing thrombus on the comparison outside CT, the splenic vein and proximal extrahepatic portal vein, are not visualized on the current exam due to  technical factors as above.    2.  Gallbladder sludge.  3.  Marked hepatic steatosis.  4.  Trace ascites.     I have personally reviewed the examination and initial interpretation  and I agree with the findings.     DOUG PANDEY MD            Discharge Medications:     Current Discharge Medication List      START taking these medications    Details   LORazepam (ATIVAN) 1 MG tablet For Score   greater than or equal to  20-give 2-4 mg-repeat assessment/vitals in 30 min.  For Score 15-19-give 1-2 mg & repeat assessment/vitals in 1 hr  For Score 8-14- give 1-2 mg & repeat assessment/vitals in 2-4 hrs.  For Score less than 8-do not give & repeat assessment/vitals in 4 hrs.  For Score less than 8 x 2 do not give &repeat assessment/vitals in 8 hrs.  Qty: 60 tablet    Associated Diagnoses: Alcohol dependence with withdrawal with complication (H)      !! ondansetron (ZOFRAN) 2 MG/ML SOLN injection Inject 2 mLs (4 mg) into the vein every 6 hours as needed for nausea or vomiting  Qty: 15 mL    Associated Diagnoses: Acute necrotizing pancreatitis      !! ondansetron (ZOFRAN) 2 MG/ML SOLN injection Inject 2 mLs (4 mg) into the vein every 30 minutes as needed for nausea or vomiting  Qty: 15 mL    Associated Diagnoses: Alcohol dependence with withdrawal with complication (H)      prochlorperazine (COMPAZINE) 5 MG/ML injection Inject 1-2 mLs (5-10 mg) into the vein every 6 hours as needed for nausea or vomiting  Qty: 120 mL    Associated Diagnoses: Acute necrotizing pancreatitis      pantoprazole (PROTONIX) 40 mg IV push injection Inject 40 mg into the vein 2 times daily  Qty: 30 each    Associated Diagnoses: Alcoholic gastritis with hemorrhage, unspecified chronicity      lactated ringers infusion Inject 200 mL/hr into the vein continuous  Qty: 1000 mL, Refills: 0    Associated Diagnoses: Acute necrotizing pancreatitis      fluconazole (DIFLUCAN) 200-0.9 MG/100ML-% infusion Inject 100 mLs (200 mg) into the vein every 24  hours  Qty: 3000 mL    Associated Diagnoses: Esophageal candidiasis (H)      heparin  drip 25,000 units in 0.45% NaCl 250 mL (see additional administration details for dose) Inject 0-3,500 Units/hr into the vein continuous    Associated Diagnoses: Splenic vein thrombosis       !! - Potential duplicate medications found. Please discuss with provider.      CONTINUE these medications which have CHANGED    Details   FLUoxetine (PROZAC) 20 MG capsule Take 1 capsule (20 mg) by mouth daily  Qty: 30 capsule    Associated Diagnoses: Alcohol dependence with withdrawal with complication (H)      propranolol (INDERAL) 10 MG tablet Take 1 tablet (10 mg) by mouth 3 times daily  Qty: 90 tablet    Associated Diagnoses: Alcohol dependence with withdrawal with complication (H)         STOP taking these medications       hydrOXYzine (ATARAX) 50 MG tablet Comments:   Reason for Stopping:                     Consultations:   Consultation during this admission received from Gastroenterology              Hospital Course:   1. Necrotizing pancreatitis - Likely acute on chronic secondary to ETOH, lipase is only mildly elevated, however CT scan at the OSH showed substantial necrosis of the tail of the pancreas without signs of infection. Is significantly hemo-concentrated.  Splenic vein thrombosis read at OSH suggests there may be some chronicity.  Per GI review of OSH CT scan feel that he has peripancreatic fluid collection without necrosis.  Radiology at [facility] have reviewed CT scan (official report pending) and feels that the splenic vein thrombosis noted on the OSH CT scan is likely acute and therefore he has been started on IV heparin infusion.  We would recommend radiology at [facility] hospital reviewing CT scan to further evaluate splenic thrombus and portal vein thrombus.  An abdominal US was done 2/7 which was limited due to fatty liver.  Results are documented above.  Given his reported history of melena and coffee ground emesis, he  underwent an EGD on 2/7 which did not show evidence of active bleeding but did show inflammation concerning for alcoholic gastritis and possible portal hypertension.  In addition, there was a concern for esophageal candidiasis.  He will be given a loading dose of 400 mg IV Fluconazole and it is recommended by the GI team here that he be continued on I Fluconazole 200 mg IV q 24 hours starting 2/8 for treatment.  His biopsies from EGD today are pending and if negative, the Fluconazole can be discontinued.  Otherwise, it should be continued for 2 weeks.  His pain was controlled with IV Dilaudid q 2 hours.  He received IVF boluses for dehydration and is continued on lactated ringers at 200 ml/hour.  We recommend that he be given a banana bag daily for alcohol withdrawal.  He has 1 L today prior to transfer.  He should have daily CMP with lipase trends as needed.     2. Elevated Lactate - Likely 2/2 liver disease, pancreatitis and dehydration.  His admission lactate was 2.1 and an recheck after EGD it was 3.1.  Although somewhat stable, this is likely indication that he continues to be hypovolemic therefore he will receive an additional 1L of NS prior to transfer.  A repeat lactate should be trended.     3. Splenic Vein Thrombosis - Seen on OSH CT scan.  CT scan is in process of being read by radiology at [facility], however, official read is pending.  Per GI staff and radiology preliminary read, concern that the splenic thrombosis is acute and treatment should be started.  A heparin gtt (high intensity) has been initiated.  Of note, he did have an EGD today that was negative for acute bleeding. His heparin 10A levels should be trended. We recommend a GI consult at [facility] to follow along with this.  Please follow up in [facility] for the CT read from [facility] which should be completed on 2/8.     3.  Concern for UGIB - Patient presented with a 3 day history of coffee ground emesis and melena and a several month history of  intermittent sharp epigastric pain that improves with eating. GI team was consulted(as above) and he underwent an EGD 2/7 which was negative for acute source of bleeding.  It did show probably alcoholic gastritis and possible portal HTN (official read above).  He was maintained initially on a pantoprazole and Octreotide gtt, however, these were discontinued.  He is being discharged on Pantoprazole 40 mg IV q 12 hours.  His Hgb has been stable 16.9 and should be trended upon transfer.     4. ETOH abuse - Patient has a long standing history, has seen a  and was preparing to fill out a Rule 25.  Drinks 1 pint to 1 L of vodka daily, and has daily withdrawal symptoms but no history of seizures.  Last drink 2/6/17.  He received 1 L of a banana bag and should continue this for another 2 days until he can tolerate PO.  A chemical dependency consult is highly recommended. He has received only one dose of Ativan today for withdrawal symptoms and is stable for transfer.     5. Hyponatremia - Na 129 at OSH and rechecked at 128, likely hypovolemic related to poor PO intake and repeated emesis vs pancreatitis.  Would recommend following Na levels.  He is continued on LR at 200mL/hour.     6. Transaminitis - at OSH /, likely secondary to ETOH abuse.  Bilirubin is mildly elevated at 2.3, without signs of obstruction.  Liver US completed today (see above).  Would recommend trending a CMP.    7. Leukocytosis - WBC 24.6.  Afebrile.  Likely inflammation 2/2 pancreatitis.  Was started on IV Ceftriaxone when concerned about UGIB, however, GI has recommended this be stopped.  UA negative.  Blood cultures sent.    8. History of Anxiety & Depression - Mood stable although experiencing mild alcohol withdrawal (has received 1 mg of Ativan today).  Managed on PTA Propanalol 10 mg PO TID, Hydroxyzine 50 mg PO q 6 hours PRN and Fluoxetine 20 mg PO daily. His Propanalol and Fluoxetine have been continued.              Final Day of Progress before Discharge:       Physical Exam:  Blood pressure 167/114, pulse 100, temperature 97.1  F (36.2  C), temperature source Oral, resp. rate 18, SpO2 92 %.  GENERAL: Alert and oriented x 3. NAD. Appears tremulous   HEENT: Anicteric sclera. PERRL. Mucous membranes moist and without lesions.   CV: RRR. S1, S2. No murmurs appreciated.   RESPIRATORY: Effort normal. Lungs CTAB with no wheezing, rales, rhonchi.   GI: Abdomen soft and non distended with normoactive bowel sounds present in all quadrants. Diffuse tenderness but without rebound, guarding.   MUSCULOSKELETAL: No joint swelling or tenderness. Moves all extremities.   NEUROLOGICAL: No focal deficits. Strength 5/5 bilaterally in upper and lower extremities.   EXTREMITIES: No peripheral edema. Intact bilateral pedal pulses.   SKIN: No jaundice. No rashes.        Data:  All laboratory data reviewed             Significant Results:     Results for orders placed or performed during the hospital encounter of 02/07/17   UPPER GI ENDOSCOPY   Result Value Ref Range    Upper GI Endoscopy       [address] [place] [address] [phone]     Endoscopy Department  _______________________________________________________________________________  Patient Name: Isaiah Hurst       Procedure Date: 2/7/2017 1:08 PM  MRN: [id number]                       Account Number: [id number]  YOB: 1980             Admit Type: Inpatient  Age: 36                                Gender: Male  Note Status: Finalized                Attending MD: Guru Staples,   Total Sedation Time:                    _______________________________________________________________________________     Procedure:           Upper GI endoscopy  Indications:         Coffee-ground emesis, Melena                       37 yo male h/o alcohol abuse, HTN, depression, anxiety                        transferred from [place], [place] where he  presented with                        worsening abdominal pain for past 3 days. Â Found to have                        pancreatitis. A lso had coffee ground emesis and melena                        which resolved. No Hb drop.Of note he has never                        undergone an upper endoscopy in the past. EGD for                        variceal screening and to evaluate for causes for upper                        GI bleeding.  Providers:           Robyn Hartman, RN, Nadya Garcia MD  Referring MD:        Michelle Jaramillo, Bernardo Carrillo  Medicines:           Monitored Anesthesia Care  Complications:       No immediate complications.  _______________________________________________________________________________  Procedure:           Pre-Anesthesia Assessment:                       - Prior to the procedure, a History and Physical was                        performed, and patient medications, allergies and                        sensitivities were reviewed. The patient's tolerance of                        previous anesthesia was reviewed.                       - The r isks and benefits of the procedure and the                        sedation options and risks were discussed with the                        patient. All questions were answered and informed                        consent was obtained.                       - Patient identification and proposed procedure were                        verified prior to the procedure by the physician and the                        nurse. The procedure was verified in the procedure room.                       - Pre-procedure physical examination revealed no                        contraindications to sedation.                       - ASA Grade Assessment: II - A patient with mild                        systemic disease.                       - Airway Examination: normal oropharyngeal airway and                        neck  mobility.                       - Abdominal Examination: abdomen tender.                       - CV Examination: normal and RRR, no murmurs, no S3 or                        S4.                        After obtaining informed consent, the endoscope was                        passed under direct vision. Throughout the procedure,                        the patient's blood pressure, pulse, and oxygen                        saturations were monitored continuously. The Endoscope                        was introduced through the mouth, and advanced to the                        second part of duodenum. The upper GI endoscopy was                        accomplished with ease. The patient tolerated the                        procedure well.                                                                                   Findings:       The Z-line was regular and was found 38 cm from the incisors. Patchy        adherent curdy white exudates were present in lower third of esophagus        suspicious for candidal esophagitis. Brushing was performed to exclude        candida. No esophageal varices       Diffuse moderately erythematous mucosa without bleeding was found in the        e ntire examined stomach. No hematin identfied in the stomach.No gastric        varices were visualized       The examined duodenum was normal.                                                                                   Impression:          - No endoscopic evidence of active upper GI bleed.                       - No esophageal or gastric varices were identified                       - Diffusely erythrematous mucosa secondary to alcholic                        gastritis and mild portal hypertensive gastropathy was                        seen                       - Normal duodenum  Recommendation:      - Return patient to [facility] for [facility] care.                       - Clear liquid diet today as tolerated .                       - Stop  octreotide, start omeprazole 20 mg BID, when he                        tolerates oral intake                       - Await pathology from esophageal brushing. Will                        empirically start fluconazole loading dose 400 mg                         followed by 200 mg/d for 14 days, stop if brushing                        negative.                       - Continue IV hydration with LR and pain control for                        pancreatitis.                       - Inpatient panc-bili consult team will follow patient                                                                                     ____________________  Guru tSaples,   2/7/2017 5:27 PM    ____________________  Guru Staples,   2/7/2017 5:27 PM  Number of Addenda: 0    Note Initiated On: 2/7/2017 1:08 PM  Scope In: 12:00:00 AM  Scope Out: 12:00:00 AM     US Abd Cmpl Abd/Pelvis Duplex Cmpl    Narrative    EXAMINATION: US ABDOMEN COMPLETE WITH DOPPLER COMPLETE, 2/7/2017 12:12  PM     COMPARISON: No similar prior. Correlation is made with outside CT  abdomen dated 2/6/2017.    HISTORY: Pancreatitis, with splenic and possible portal vein  thrombosis    TECHNIQUE: The abdomen was scanned in standard fashion with  specialized ultrasound transducer(s) using both gray-scale, color  Doppler, and spectral flow techniques.    Findings:    Exam significantly limited by fatty liver. Specifically, Doppler  evaluation of hepatic vasculature is limited and likely unreliable.    Liver: The liver demonstrates increased echogenicity diffusely,  resulting in significant loss of ultrasound beam penetration. No  evidence of a focal hepatic mass.     Extrahepatic portal vein flow is antegrade, measuring 36 cm/sec.  Right portal vein flow is antegrade, measuring 10 cm/sec.  Left portal vein not visualized.    Flow in the hepatic artery is towards the liver and:  113 cm/sec peak systolic  0.45 resistive index.     The splenic vein is not seen.  The  left, middle, and right hepatic  veins are nonvisualized centrally, however are patent peripherally  with flow towards the IVC. The visualized IVC is patent with flow  towards the heart.   The aorta is not dilated.    Gallbladder: Echogenic layering sludge in the gallbladder. No evidence  of discrete cholelithiasis. No gallbladder wall thickening or  pericholecystic fluid. Negative sonographic Mitchell's sign.    Bile Ducts: Both the intra- and extrahepatic biliary system are of  normal caliber.  The common bile duct measures 4 mm in diameter.    Pancreas: Not visualized.    Kidneys: Both kidneys are of normal echotexture, without mass or  hydronephrosis.   The craniocaudal dimensions are: right- 12.8 cm,  left- 12.6 cm.    Spleen: The spleen is not visualized.     Fluid: There is trace ascites.      Impression    Impression:   1.  The vasculature seen containing thrombus on the comparison outside  CT, the splenic vein and proximal extrahepatic portal vein, are not  visualized on the current exam due to technical factors as above.   2.  Gallbladder sludge.  3.  Marked hepatic steatosis.  4.  Trace ascites.    I have personally reviewed the examination and initial interpretation  and I agree with the findings.    DOUG PANDEY MD   Comprehensive metabolic panel   Result Value Ref Range    Sodium 128 (L) 133 - 144 mmol/L    Potassium 3.8 3.4 - 5.3 mmol/L    Chloride 94 94 - 109 mmol/L    Carbon Dioxide 21 20 - 32 mmol/L    Anion Gap 14 3 - 14 mmol/L    Glucose 137 (H) 70 - 99 mg/dL    Urea Nitrogen 13 7 - 30 mg/dL    Creatinine 0.71 0.66 - 1.25 mg/dL    GFR Estimate >90  Non  GFR Calc   >60 mL/min/1.7m2    GFR Estimate If Black >90   GFR Calc   >60 mL/min/1.7m2    Calcium 6.7 (L) 8.5 - 10.1 mg/dL    Bilirubin Total 2.4 (H) 0.2 - 1.3 mg/dL    Albumin 2.6 (L) 3.4 - 5.0 g/dL    Protein Total 6.0 (L) 6.8 - 8.8 g/dL    Alkaline Phosphatase 116 40 - 150 U/L     (H) 0 - 70 U/L    AST  297 (H) 0 - 45 U/L   CBC with platelets differential   Result Value Ref Range    WBC 24.6 (H) 4.0 - 11.0 10e9/L    RBC Count 5.49 4.4 - 5.9 10e12/L    Hemoglobin 16.9 13.3 - 17.7 g/dL    Hematocrit 48.4 40.0 - 53.0 %    MCV 88 78 - 100 fl    MCH 30.8 26.5 - 33.0 pg    MCHC 34.9 31.5 - 36.5 g/dL    RDW 15.3 (H) 10.0 - 15.0 %    Platelet Count 86 (L) 150 - 450 10e9/L    Diff Method Automated Method     % Neutrophils 88.0 %    % Lymphocytes 5.9 %    % Monocytes 5.6 %    % Eosinophils 0.0 %    % Basophils 0.0 %    % Immature Granulocytes 0.5 %    Nucleated RBCs 0 0 /100    Absolute Neutrophil 21.6 (H) 1.6 - 8.3 10e9/L    Absolute Lymphocytes 1.5 0.8 - 5.3 10e9/L    Absolute Monocytes 1.4 (H) 0.0 - 1.3 10e9/L    Absolute Eosinophils 0.0 0.0 - 0.7 10e9/L    Absolute Basophils 0.0 0.0 - 0.2 10e9/L    Abs Immature Granulocytes 0.1 0 - 0.4 10e9/L    Absolute Nucleated RBC 0.0    INR   Result Value Ref Range    INR 1.42 (H) 0.86 - 1.14   Magnesium   Result Value Ref Range    Magnesium 1.6 1.6 - 2.3 mg/dL   Lactic acid whole blood   Result Value Ref Range    Lactic Acid 2.1 0.7 - 2.1 mmol/L   Amylase   Result Value Ref Range    Amylase 82 30 - 110 U/L   Lipase   Result Value Ref Range    Lipase 814 (H) 73 - 393 U/L   CBC with platelets   Result Value Ref Range    WBC 22.7 (H) 4.0 - 11.0 10e9/L    RBC Count 5.30 4.4 - 5.9 10e12/L    Hemoglobin 16.6 13.3 - 17.7 g/dL    Hematocrit 47.8 40.0 - 53.0 %    MCV 90 78 - 100 fl    MCH 31.3 26.5 - 33.0 pg    MCHC 34.7 31.5 - 36.5 g/dL    RDW 15.5 (H) 10.0 - 15.0 %    Platelet Count 68 (L) 150 - 450 10e9/L   Basic metabolic panel   Result Value Ref Range    Sodium 127 (L) 133 - 144 mmol/L    Potassium 4.1 3.4 - 5.3 mmol/L    Chloride 94 94 - 109 mmol/L    Carbon Dioxide 19 (L) 20 - 32 mmol/L    Anion Gap 14 3 - 14 mmol/L    Glucose 104 (H) 70 - 99 mg/dL    Urea Nitrogen 15 7 - 30 mg/dL    Creatinine 0.73 0.66 - 1.25 mg/dL    GFR Estimate >90  Non  GFR Calc   >60  mL/min/1.7m2    GFR Estimate If Black >90   GFR Calc   >60 mL/min/1.7m2    Calcium 6.6 (L) 8.5 - 10.1 mg/dL   Phosphorus   Result Value Ref Range    Phosphorus 2.7 2.5 - 4.5 mg/dL   UA with Microscopic reflex to Culture   Result Value Ref Range    Color Urine Yellow     Appearance Urine Clear     Glucose Urine Negative NEG mg/dL    Bilirubin Urine Negative NEG    Ketones Urine Negative NEG mg/dL    Specific Gravity Urine >1.050 (H) 1.003 - 1.035    Blood Urine Trace (A) NEG    pH Urine 6.0 5.0 - 7.0 pH    Protein Albumin Urine 30 (A) NEG mg/dL    Urobilinogen mg/dL 2.0 0.0 - 2.0 mg/dL    Nitrite Urine Negative NEG    Leukocyte Esterase Urine Negative NEG    Source Midstream Urine     WBC Urine 4 (H) 0 - 2 /HPF    RBC Urine 2 0 - 2 /HPF    Transitional Epi <1 0 - 1 /HPF    Mucous Urine Present (A) NEG /LPF   Lactic acid level STAT   Result Value Ref Range    Lactic Acid 3.1 (H) 0.7 - 2.1 mmol/L   GI Pancreaticobiliary Adult IP Consult: Patient to be seen: ASAP within 4 hrs; Call back #: [phone] or page [phone] if not within 15 minutes; Necrotizing pancreatitis, coffee ground emesis, melena, etoh abuse; Consultant may enter orders: Yes    Narrative    Nadya Garcia MD     2/7/2017  5:01 PM        GASTROENTEROLOGY CONSULTATION      Date of Admission:  2/7/2017  Consulting physician: Michelle Jaramillo NP          ASSESSMENT AND RECOMMENDATIONS:     Assessment:    37 yo male h/o alcohol abuse, HTN, depression, anxiety coming as    Transfer from [place], [place] where he presented with worsening   abdominal pain for past 3 days.   -He was diagnosed with pancreatitis based on lipase of around   1000, pain and CT findings. On reviewing imaging CT abdomen w   contrast, he has acute peripancreatic fluid collection, no   necrosis. His BISAP 1 ( SIRS +). Most likely alcohol induced.   -He was also had possible GI bleed based on history of coffee   ground emesis and melena and it has resolved, EGD performed today    showed no varices, diffuse gastric erythema, therefore most   likely it was from Tori Mitchell tear, or slow oozing from    gastritis, portal hypertensive gastropathy.    - He was also found to have splenic vein thrombosis, extending to   portal vein, most likely acute from underlying pancreatitis, no   sure of acuity, no prior imaging to compare, but no collaterals   in recent imaging, favoring towards acute.  USG did not show any   gallstones.      Recommendations    Continue aggressive fluid resuscitation with LR, continue   maintenance fluid. Recommend starting low fat clear liquid diet   today and ADAT. Consider NJ if not tolerating oral intake in   couple of days.     Daily CBC, hematocrit, BMP, LFTs.     Recommend checking triglycerides.     Continue oral  Omeprazole 20 mg BID, stop  octreotide.     Recommend starting anticoagulation for PVT considering acute   etiology, he will need close f/u at [facility], need to be treated for   3-6 months.     Recommend getting CT reviewed by radiologist here at [facility].     Recommend getting outside records from [facility].     Alcohol cessation, will need social work consult.       Gastroenterology follow up recommendations: f/u at [facility].       Thank you for involving us in this patient's care. Please do not   hesitate to contact the GI service with any questions or   concerns. Pt care plan discussed with Dr. Staples. , GI   staff physician.    Nadya Garcia  GI fellow         Chief Complaint:   Abdominal pain         HPI:     37 yo male h/o alcohol abuse, HTN, depression, anxiety coming as    Transfer from [place], [place] where he presented with worsening   abdominal pain for past 3 days. According to him he was having   chronic pain for past 2 months on/off. For past 3 days pain   became constant, epigastric and radiating to left abdomen. He had   nausea and multiple episodes of vomiting on Saturday, he was not   able to keep anything down. He had coffee ground emesis  on   Saturday initially and it resolved yesterday. He denied any fever   but feeling cold. He never had this problem before. He was   drinking around 1 litre of vodka daily, last drink yesterday. He   smokes around 0.5 pack per day. He was told around 2 years ago   that he has fatty liver, not aware of cirrhosis. He never had   ascites, GI bleed before. He denied any NSAIDs, not on any   anticoagulation. He denied any chest pain, SOB, palpitations.            Past Medical History:     Reviewed and edited as appropriate  Past Medical History   Diagnosis Date     Hypertension      Depressive disorder             Past Surgical History:   Reviewed and edited as appropriate   No past surgical history on file.         Previous Endoscopy:   No results found for this or any previous visit.         Social History:   Reviewed and edited as appropriate  Social History     Social History     Marital Status: Single     Spouse Name: N/A     Number of Children: N/A     Years of Education: N/A     Occupational History     Not on file.     Social History Main Topics     Smoking status: Current Every Day Smoker -- 0.25 packs/day     Smokeless tobacco: Never Used     Alcohol Use: Yes     Drug Use: No     Sexual Activity: Not on file     Other Topics Concern     Not on file     Social History Narrative            Family History:   Reviewed and edited as appropriate  Brother  from alcohol liver disease.       Allergies:   Reviewed and edited as appropriate     Allergies   Allergen Reactions     Naproxen Rash            Medications:     Current Facility-Administered Medications   Medication     [START ON 2017] influenza quadrivalent (PF) vacc age 3 yrs   and older (FLUZONE or Flulaval) injection 0.5 mL     naloxone (NARCAN) injection 0.1-0.4 mg     ondansetron (ZOFRAN-ODT) ODT tab 4 mg    Or     ondansetron (ZOFRAN) injection 4 mg     LORazepam (ATIVAN) tablet 1-4 mg     HYDROmorphone (PF) (DILAUDID) injection 0.5-1 mg     [START  ON 2/8/2017] pneumococcal vaccine (PNEUMOVAX 23-carlos)   injection 0.5 mL     potassium chloride SA (K-DUR/KLOR-CON M) CR tablet 20-40 mEq     potassium chloride (KLOR-CON) Packet 20-40 mEq     potassium chloride 10 mEq in 100 mL intermittent infusion     potassium chloride 10 mEq in 100 mL intermittent infusion with   10 mg lidocaine     potassium chloride 20 mEq in 50 mL intermittent infusion     magnesium sulfate 4 g in 100 mL sterile water (premade)     sodium phosphate 15 mmol in D5W intermittent infusion     sodium phosphate 20 mmol in D5W intermittent infusion     sodium phosphate 25 mmol in D5W intermittent infusion     cefTRIAXone (ROCEPHIN) 1 g vial to attach to  mL bag for   ADULTS or NS 50 mL bag for PEDS     ondansetron (ZOFRAN-ODT) ODT tab 4 mg    Or     ondansetron (ZOFRAN) injection 4 mg     prochlorperazine (COMPAZINE) injection 5-10 mg     meperidine (DEMEROL) injection 12.5 mg     naloxone (NARCAN) injection 0.1-0.4 mg     pantoprazole (PROTONIX) 40 mg IV push injection     lactated ringers infusion     fluconazole (DIFLUCAN) intermittent infusion 400 mg in NaCl     [START ON 2/8/2017] fluconazole (DIFLUCAN) intermittent   infusion 200 mg in NaCl             Review of Systems:   A complete review of systems was performed and is negative except   as noted in the HPI           Physical Exam:     /110 mmHg  Temp(Src) 95.2  F (35.1  C) (Oral)  Resp 16    SpO2 93%  Wt:   Wt Readings from Last 2 Encounters:   01/09/17 99.139 kg (218 lb 9 oz)   02/16/16 87.091 kg (192 lb)        Constitutional: drowsy, not dyspneic/diaphoretic, no acute   distress, AAO x 3.   Eyes: Sclera anicteric/ no pallor  Ears/nose/mouth/throat: Normal oropharynx without ulcers or   exudate, mucus membranes moist,  CV: Normal S1, S2, no murmurs.  Respiratory: clear to auscultation b/l, no murmur  Abd: Nondistended, +bs, tenderness in mid and left abdomen, no   peritoneal signs  Skin: warm, perfused, no jaundice  Neuro:  AAO x 3, no focal motor sensory deficits           Data:   Labs and imaging below were independently reviewed and   interpreted    BMP  Recent Labs  Lab 02/07/17 0613   *   POTASSIUM 3.8   CHLORIDE 94   PARMINDER 6.7*   CO2 21   BUN 13   CR 0.71   *     CBC  Recent Labs  Lab 02/07/17 0613   WBC 24.6*   RBC 5.49   HGB 16.9   HCT 48.4   MCV 88   MCH 30.8   MCHC 34.9   RDW 15.3*   PLT 86*     INR  Recent Labs  Lab 02/07/17 0613   INR 1.42*     LFTs  Recent Labs  Lab 02/07/17 0613   ALKPHOS 116   *   *   BILITOTAL 2.4*   PROTTOTAL 6.0*   ALBUMIN 2.6*      PANC  Recent Labs  Lab 02/07/17 0613   LIPASE 814*   AMYLASE 82       Imaging: reviewed       ABO/Rh type and screen   Result Value Ref Range    ABO O     RH(D)  Pos     Antibody Screen Neg     Test Valid Only At       [facility]    Specimen Expires 02/10/2017    Fungus Culture, non-blood   Result Value Ref Range    Specimen Description Esophageal BRUSHING     Culture Micro Pending     Micro Report Status Pending       Recent Results (from the past 48 hour(s))   US Abd Cmpl Abd/Pelvis Duplex Cmpl    Narrative    EXAMINATION: US ABDOMEN COMPLETE WITH DOPPLER COMPLETE, 2/7/2017 12:12  PM     COMPARISON: No similar prior. Correlation is made with outside CT  abdomen dated 2/6/2017.    HISTORY: Pancreatitis, with splenic and possible portal vein  thrombosis    TECHNIQUE: The abdomen was scanned in standard fashion with  specialized ultrasound transducer(s) using both gray-scale, color  Doppler, and spectral flow techniques.    Findings:    Exam significantly limited by fatty liver. Specifically, Doppler  evaluation of hepatic vasculature is limited and likely unreliable.    Liver: The liver demonstrates increased echogenicity diffusely,  resulting in significant loss of ultrasound beam penetration. No  evidence of a focal hepatic mass.     Extrahepatic portal vein flow is antegrade, measuring 36  cm/sec.  Right portal vein flow is antegrade, measuring 10 cm/sec.  Left portal vein not visualized.    Flow in the hepatic artery is towards the liver and:  113 cm/sec peak systolic  0.45 resistive index.     The splenic vein is not seen.  The left, middle, and right hepatic  veins are nonvisualized centrally, however are patent peripherally  with flow towards the IVC. The visualized IVC is patent with flow  towards the heart.   The aorta is not dilated.    Gallbladder: Echogenic layering sludge in the gallbladder. No evidence  of discrete cholelithiasis. No gallbladder wall thickening or  pericholecystic fluid. Negative sonographic Mitchell's sign.    Bile Ducts: Both the intra- and extrahepatic biliary system are of  normal caliber.  The common bile duct measures 4 mm in diameter.    Pancreas: Not visualized.    Kidneys: Both kidneys are of normal echotexture, without mass or  hydronephrosis.   The craniocaudal dimensions are: right- 12.8 cm,  left- 12.6 cm.    Spleen: The spleen is not visualized.     Fluid: There is trace ascites.      Impression    Impression:   1.  The vasculature seen containing thrombus on the comparison outside  CT, the splenic vein and proximal extrahepatic portal vein, are not  visualized on the current exam due to technical factors as above.   2.  Gallbladder sludge.  3.  Marked hepatic steatosis.  4.  Trace ascites.    I have personally reviewed the examination and initial interpretation  and I agree with the findings.    DOUG PANDEY MD                Pending Results:   Unresulted Labs Ordered in the Past 30 Days of this Admission     No orders found from 12/10/2016 to 2/8/2017.                  Discharge Instructions and Follow-Up:     Discharge Procedure Orders  Reason for your hospital stay   Order Comments: You were admitted to the hospital for pancreatitis and possible GI bleeding.  You received IVF and underwent an EGD to rule out a GI bleed.     Intake and output   Order  Comments: Every shift     Daily weights   Order Comments: Call Provider for weight gain of more than 2 pounds per day or 5 pounds per week.     IV access   Order Comments: Peripheral IV.     Follow Up and recommended labs and tests   Order Comments: Follow up with [facility] admitting MD upon arrival.  Recommended labs: CBC, CMP, lipase daily.  Hgb q 6 hours. INR daily for MELD calculation.     Activity - Up ad osman   Order Specific Question Answer Comments   Is discharge order? Yes      Full Code     Fall precautions     NPO   Order Specific Question Answer Comments   Is discharge order? Yes         Michelle Jaramillo CNP  Hospitalist Service  Pager [phone]  February 7, 2017    Time spent on patient: 35 minutes total including face to face and coordinating care time reviewing current illness, any medication changes, and the care plan for today.    Discharge plan was discussed with Dr. Carrillo.                    Consult Notes      Consults by Nadya Garcia MD at 2/7/2017  9:33 AM     Author:  Nadya Garcia MD Service:  Gastroenterology Author Type:  Resident    Filed:  2/7/2017  5:01 PM Note Time:  2/7/2017  9:33 AM Status:  Cosign Needed    :  Nadya Garcia MD (Resident) Cosign Required:  Yes     Consult Orders:    1. GI Pancreaticobiliary Adult IP Consult: Patient to be seen: ASAP within 4 hrs; Call back #: [phone] or page [phone] if not within 15 minutes; Necrotizing pancreatitis, coffee ground emesis, melena, etoh abuse; Consultant may enter orders: Yes [[id number]] ordered by Michelle Jaramillo NP at 02/07/17 0809                      GASTROENTEROLOGY CONSULTATION      Date of Admission:  2/7/2017  Consulting physician: Michelle Jaramillo NP          ASSESSMENT AND RECOMMENDATIONS:     Assessment:    37 yo male h/o alcohol abuse, HTN, depression, anxiety coming as  Transfer from [place], [place] where he presented with worsening abdominal pain for past 3 days.   -He was diagnosed with pancreatitis based on lipase  of around 1000, pain and CT findings. On reviewing imaging CT abdomen w contrast, he has acute peripancreatic fluid collection, no necrosis. His BISAP 1 ( SIRS +). Most likely alcohol induced.   -He was also had possible GI bleed based on history of coffee ground emesis and melena and it has resolved, EGD performed today showed no varices, diffuse gastric erythema, therefore most likely it was from Tori Mitchell tear, or slow oozing from  gastritis, portal hypertensive gastropathy.    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  USG did not show any gallstones.      Recommendations    Continue aggressive fluid resuscitation with LR, continue maintenance fluid. Recommend starting low fat clear liquid diet today and ADAT. Consider [place] if not tolerating oral intake in couple of days.     Daily CBC, hematocrit, BMP, LFTs.     Recommend checking triglycerides.     Continue oral  Omeprazole 20 mg BID, stop  octreotide.     Recommend starting anticoagulation for PVT considering acute etiology, he will need close f/u at [facility], need to be treated for 3-6 months.     Recommend getting CT reviewed by radiologist here at [facility].     Recommend getting outside records from [facility].     Alcohol cessation, will need social work consult.       Gastroenterology follow up recommendations: f/u at [facility].       Thank you for involving us in this patient's care. Please do not hesitate to contact the GI service with any questions or concerns. Pt care plan discussed with Dr. Staples. , GI staff physician.    Nadya Garcia  GI fellow         Chief Complaint:   Abdominal pain         HPI:     35 yo male h/o alcohol abuse, HTN, depression, anxiety coming as  Transfer from [place], [place] where he presented with worsening abdominal pain for past 3  days. According to him he was having chronic pain for past 2 months on/off. For past 3 days pain became constant, epigastric and radiating to left abdomen. He had nausea and multiple episodes of vomiting on Saturday, he was not able to keep anything down. He had coffee ground emesis on Saturday initially and it resolved yesterday. He denied any fever but feeling cold. He never had this problem before. He was drinking around 1 litre of vodka daily, last drink yesterday. He smokes around 0.5 pack per day. He was told around 2 years ago that he has fatty liver, not aware of cirrhosis. He never had ascites, GI bleed before. He denied any NSAIDs, not on any anticoagulation. He denied any chest pain, SOB, palpitations.            Past Medical History:     Reviewed and edited as appropriate  Past Medical History   Diagnosis Date     Hypertension      Depressive disorder             Past Surgical History:   Reviewed and edited as appropriate   No past surgical history on file.         Previous Endoscopy:   No results found for this or any previous visit.         Social History:   Reviewed and edited as appropriate  Social History     Social History     Marital Status: Single     Spouse Name: N/A     Number of Children: N/A     Years of Education: N/A     Occupational History     Not on file.     Social History Main Topics     Smoking status: Current Every Day Smoker -- 0.25 packs/day     Smokeless tobacco: Never Used     Alcohol Use: Yes     Drug Use: No     Sexual Activity: Not on file     Other Topics Concern     Not on file     Social History Narrative            Family History:   Reviewed and edited as appropriate  Brother  from alcohol liver disease.       Allergies:   Reviewed and edited as appropriate     Allergies   Allergen Reactions     Naproxen Rash            Medications:     Current Facility-Administered Medications   Medication     [START ON 2017] influenza quadrivalent (PF) vacc age 3 yrs and older  (FLUZONE or Flulaval) injection 0.5 mL     naloxone (NARCAN) injection 0.1-0.4 mg     ondansetron (ZOFRAN-ODT) ODT tab 4 mg    Or     ondansetron (ZOFRAN) injection 4 mg     LORazepam (ATIVAN) tablet 1-4 mg     HYDROmorphone (PF) (DILAUDID) injection 0.5-1 mg     [START ON 2/8/2017] pneumococcal vaccine (PNEUMOVAX 23-carlos) injection 0.5 mL     potassium chloride SA (K-DUR/KLOR-CON M) CR tablet 20-40 mEq     potassium chloride (KLOR-CON) Packet 20-40 mEq     potassium chloride 10 mEq in 100 mL intermittent infusion     potassium chloride 10 mEq in 100 mL intermittent infusion with 10 mg lidocaine     potassium chloride 20 mEq in 50 mL intermittent infusion     magnesium sulfate 4 g in 100 mL sterile water (premade)     sodium phosphate 15 mmol in D5W intermittent infusion     sodium phosphate 20 mmol in D5W intermittent infusion     sodium phosphate 25 mmol in D5W intermittent infusion     cefTRIAXone (ROCEPHIN) 1 g vial to attach to  mL bag for ADULTS or NS 50 mL bag for PEDS     ondansetron (ZOFRAN-ODT) ODT tab 4 mg    Or     ondansetron (ZOFRAN) injection 4 mg     prochlorperazine (COMPAZINE) injection 5-10 mg     meperidine (DEMEROL) injection 12.5 mg     naloxone (NARCAN) injection 0.1-0.4 mg     pantoprazole (PROTONIX) 40 mg IV push injection     lactated ringers infusion     fluconazole (DIFLUCAN) intermittent infusion 400 mg in NaCl     [START ON 2/8/2017] fluconazole (DIFLUCAN) intermittent infusion 200 mg in NaCl             Review of Systems:   A complete review of systems was performed and is negative except as noted in the HPI           Physical Exam:     /110 mmHg  Temp(Src) 95.2  F (35.1  C) (Oral)  Resp 16  SpO2 93%  Wt:   Wt Readings from Last 2 Encounters:   01/09/17 99.139 kg (218 lb 9 oz)   02/16/16 87.091 kg (192 lb)        Constitutional: drowsy, not dyspneic/diaphoretic, no acute distress, AAO x 3.   Eyes: Sclera anicteric/ no pallor  Ears/nose/mouth/throat: Normal oropharynx  without ulcers or exudate, mucus membranes moist,  CV: Normal S1, S2, no murmurs.  Respiratory: clear to auscultation b/l, no murmur  Abd: Nondistended, +bs, tenderness in mid and left abdomen, no peritoneal signs  Skin: warm, perfused, no jaundice  Neuro: AAO x 3, no focal motor sensory deficits           Data:   Labs and imaging below were independently reviewed and interpreted    BMP  Recent Labs  Lab 02/07/17 0613   *   POTASSIUM 3.8   CHLORIDE 94   PARMINDER 6.7*   CO2 21   BUN 13   CR 0.71   *     CBC  Recent Labs  Lab 02/07/17 0613   WBC 24.6*   RBC 5.49   HGB 16.9   HCT 48.4   MCV 88   MCH 30.8   MCHC 34.9   RDW 15.3*   PLT 86*     INR  Recent Labs  Lab 02/07/17 0613   INR 1.42*     LFTs  Recent Labs  Lab 02/07/17 0613   ALKPHOS 116   *   *   BILITOTAL 2.4*   PROTTOTAL 6.0*   ALBUMIN 2.6*      PANC  Recent Labs  Lab 02/07/17 0613   LIPASE 814*   AMYLASE 82       Imaging: reviewed                    Progress Notes - Physician (Notes for yesterday and today)      Progress Notes by Francis Fonseca MD at 2/7/2017  2:01 AM     Author:  Francis Fonseca MD Service:  General Medicine Author Type:  Physician    Filed:  2/7/2017  2:05 AM Note Time:  2/7/2017  2:01 AM Status:  Signed    :  Francis Fonseca MD (Physician)           [facility]  Transfer Triage Note    Date of call: 02/07/2017  Time of call: 2:02 AM    Reason for Transfer:Further diagnostic work up, management, and consultation for specialized care  Diagnosis: Necrotizing Pancreatitis    Outside Records: Available  Additional records requested to be faxed to [phone].    Stability of Patient: Patient is vitally stable, with no critical labs, and will likely remain stable throughout the transfer process    Expected Time of Arrival for Transfer: 0-8 hours    Recommendations for Management and Stabilization: Given    Additional Comments: Pt is a 36 year old with history of  ETOH abuse, who presents with a 3 day history of generalized abdominal pain and nausea.  ED work-up showed an elevated WBC of 18, Lipase of 1000, and mild transaminitis.  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 He is otherwise vitally stable.  He will be transferred here for continued care, and GI consultation if needed.     Francis Fonseca MD               Procedure Notes     No notes of this type exist for this encounter.      Progress Notes - Therapies (Notes from 02/04/17 through 02/07/17)     No notes of this type exist for this encounter.                                          INTERAGENCY TRANSFER FORM - LAB / IMAGING / EKG / EMG RESULTS   2/7/2017                       [address] [facility] BANK: [phone]            Unresulted Labs (24h ago through future)    Start       Ordered    02/10/17 0600  CBC with platelets -  (HIGH intensity: NO Loading Dose (for patient weight less than 100 kg))   EVERY THREE DAYS,   Routine     Comments:  Every 3 days while on heparin    02/07/17 1704    02/08/17 0530  CBC with platelets   AM DRAW,   Routine     Comments:  Last Lab Result: HEMOGLOBIN (g/dL)       Date                     Value                 02/07/2017               16.9             ----------    02/07/17 0811    02/08/17 0530  Comprehensive metabolic panel   AM DRAW,   Routine      02/07/17 0811    02/08/17 0530  Lipase   AM DRAW,   Routine      02/07/17 0811    02/08/17 0530  Heparin Xa (10a) Level -  (HIGH intensity: NO Loading Dose (for patient weight less than 100 kg))   DAILY,   Routine      02/07/17 1704    02/08/17 0000  Heparin 10a Level   TIMED - ONCE,   Timed      02/07/17 1803    02/07/17 1715  CBC with platelets -  (HIGH intensity: NO Loading Dose (for patient weight less than 100 kg))   STAT,   STAT     Comments:  Last Lab Result:  "HEMOGLOBIN (g/dL)       Date                     Value                 02/07/2017               16.9             ----------    02/07/17 1704    02/07/17 1356  Allergen candida albicans IgE  Routine     Comments:  Specimen 1: Rule out Candidia    02/07/17 1356    Unscheduled  Potassium -  (Potassium Replacement - \"Standard\" - For K levels less than 3.4 mmol/L - UU,UR,UA,RH,SH,PH,WY )   CONDITIONAL (SPECIFY),   Routine     Comments:  Obtain Potassium Level for these conditions:  *IF no potassium result within 24 hours before initiation of order set, draw potassium level with next lab collect.    *2 HOURS AFTER last IV potassium replacement dose and 4 hours after an oral replacement dose.  *Next morning after potassium dose.     Repeat Potassium Replacement if necessary.    02/07/17 0813    Unscheduled  Magnesium -  (Magnesium Replacement -  Adult - \"Standard\" - Replacement for all levels less than 1.6 mg/dL )   CONDITIONAL (SPECIFY),   Routine     Comments:  Obtain Magnesium Level for these conditions:  *IF no magnesium result within 24 hrs before initiation of order set, draw magnesium level with next lab collect.    *2 HOURS AFTER last magnesium replacement dose when magnesium replacement given for level less than 1.6   *Next morning after magnesium dose.     Repeat Magnesium Replacement if necessary.    02/07/17 0813    Unscheduled  Phosphorus -  (or    SODIUM Phosphate - \"Standard\" - Replacement for levels less than or equal to 2.4 mg/dL )   CONDITIONAL (SPECIFY),   Routine     Comments:  *IF no phosphorus result within 24 hours before initiation of order set, draw phosphorus level with next lab collect.    *2 HOURS AFTER last phosphorus replacement dose for levels less than 2.0.  *Next morning after phosphorus dose.     Repeat Phosphorus Replacement if necessary.    02/07/17 0813    Unscheduled  Heparin Xa (10a) Level -  (HIGH intensity: NO Loading Dose (for patient weight less than 100 kg))   CONDITIONAL X 1 " STAT,   STAT     Comments:  Release order 6 Hours after heparin is started    02/07/17 1704    Unscheduled  Heparin Xa (10a) Level -  (HIGH intensity: NO Loading Dose (for patient weight less than 100 kg))   CONDITIONAL (SPECIFY),   Routine     Comments:  Release order 6 hours after any heparin dose change    02/07/17 1704         Lab Results - 3 Days (02/07/17 - 02/07/17)      Blood culture [[id number]]  Resulted: 02/07/17 1848, Result status: In process    Ordering provider: Michelle Jaramillo NP  02/07/17 0901 Resulting lab: [facility]    Specimen Information    Type Source Collected On   Blood  02/07/17 1658            Blood culture [[id number]]  Resulted: 02/07/17 1848, Result status: In process    Ordering provider: Michelle Jaramillo NP  02/07/17 0901 Resulting lab: [facility]    Specimen Information    Type Source Collected On   Blood  02/07/17 1701            Basic metabolic panel [[id number]] (Abnormal)  Resulted: 02/07/17 1753, Result status: Final result    Ordering provider: Michelle Jaramillo NP  02/07/17 0820 Resulting lab: [facility]    Specimen Information    Type Source Collected On   Blood  02/07/17 1658          Components       Value Reference Range Flag Lab   Sodium 127 133 - 144 mmol/L L 51   Potassium 4.1 3.4 - 5.3 mmol/L  51   Comment:  Specimen slightly hemolyzed, potassium may be falsely elevated   Chloride 94 94 - 109 mmol/L  51   Carbon Dioxide 19 20 - 32 mmol/L L 51   Anion Gap 14 3 - 14 mmol/L  51   Glucose 104 70 - 99 mg/dL H 51   Urea Nitrogen 15 7 - 30 mg/dL  51   Creatinine 0.73 0.66 - 1.25 mg/dL  51   GFR Estimate -- >60 mL/min/1.7m2  51   Result:         >90  Non  GFR Calc     GFR Estimate If Black -- >60 mL/min/1.7m2  51   Result:         >90   GFR Calc     Calcium 6.6 8.5 - 10.1 mg/dL L 51   Result:              Fungus Culture, non-blood [[id number]]  Resulted: 02/07/17 1753, Result status: Preliminary result    Ordering provider:  Francis Fonseca MD  02/07/17 1346 Resulting lab: [facility]    Specimen Information    Type Source Collected On     02/07/17 1346          Components       Value Reference Range Flag Lab   Specimen Description Esophageal BRUSHING   75   Culture Micro Pending   75   Micro Report Status Pending   75            CBC with platelets [[id number]] (Abnormal)  Resulted: 02/07/17 1720, Result status: Final result    Ordering provider: Michelle Jaramillo NP  02/07/17 0820 Resulting lab: [facility]    Specimen Information    Type Source Collected On   Blood  02/07/17 1658          Components       Value Reference Range Flag Lab   WBC 22.7 4.0 - 11.0 10e9/L H 51   RBC Count 5.30 4.4 - 5.9 10e12/L  51   Hemoglobin 16.6 13.3 - 17.7 g/dL  51   Hematocrit 47.8 40.0 - 53.0 %  51   MCV 90 78 - 100 fl  51   MCH 31.3 26.5 - 33.0 pg  51   MCHC 34.7 31.5 - 36.5 g/dL  51   RDW 15.5 10.0 - 15.0 % H 51   Platelet Count 68 150 - 450 10e9/L L 51            Lactic acid level STAT [[id number]] (Abnormal)  Resulted: 02/07/17 1717, Result status: Final result    Ordering provider: Bernardo Carrillo MD  02/07/17 1645 Resulting lab: [facility]    Specimen Information    Type Source Collected On   Blood  02/07/17 1658          Components       Value Reference Range Flag Lab   Lactic Acid 3.1 0.7 - 2.1 mmol/L H 51            UA with Microscopic reflex to Culture [[id number]] (Abnormal)  Resulted: 02/07/17 1057, Result status: Final result    Ordering provider: Michelle Jaramillo NP  02/07/17 0901 Resulting lab: [facility]    Specimen Information    Type Source Collected On   Urine  02/07/17 0910          Components       Value Reference Range Flag Lab   Color Urine Yellow   51   Appearance Urine Clear   51   Glucose Urine Negative NEG mg/dL  51   Bilirubin Urine Negative NEG   51   Ketones Urine Negative NEG mg/dL  51    Specific Gravity Urine >1.050 1.003 - 1.035  H 51   Blood Urine Trace NEG  A 51   pH Urine 6.0 5.0 - 7.0 pH  51   Protein Albumin Urine 30 NEG mg/dL A 51   Urobilinogen mg/dL 2.0 0.0 - 2.0 mg/dL  51   Nitrite Urine Negative NEG   51   Leukocyte Esterase Urine Negative NEG   51   Source Midstream Urine   51   WBC Urine 4 0 - 2 /HPF H 51   RBC Urine 2 0 - 2 /HPF  51   Transitional Epi <1 0 - 1 /HPF  51   Mucous Urine Present NEG /LPF A 51            Phosphorus [[id number]]  Resulted: 02/07/17 0845, Result status: Final result    Ordering provider: Francis Fonseca MD  02/07/17 0613 Resulting lab: [facility]    Specimen Information    Type Source Collected On     02/07/17 0613          Components       Value Reference Range Flag Lab   Phosphorus 2.7 2.5 - 4.5 mg/dL  51            Amylase [[id number]]  Resulted: 02/07/17 0821, Result status: Final result    Ordering provider: Francis Fonseca MD  02/07/17 0613 Resulting lab: [facility]    Specimen Information    Type Source Collected On     02/07/17 0613          Components       Value Reference Range Flag Lab   Amylase 82 30 - 110 U/L  51            Lipase [[id number]] (Abnormal)  Resulted: 02/07/17 0821, Result status: Final result    Ordering provider: Francis Fonseca MD  02/07/17 0613 Resulting lab: [facility]    Specimen Information    Type Source Collected On     02/07/17 0613          Components       Value Reference Range Flag Lab   Lipase 814 73 - 393 U/L H 51            Lactic acid whole blood [[id number]]  Resulted: 02/07/17 0726, Result status: Final result    Ordering provider: Francis Fonseca MD  02/07/17 0622 Resulting lab: [facility]    Specimen Information    Type Source Collected On   Blood  02/07/17 0653          Components       Value Reference Range Flag Lab   Lactic Acid 2.1 0.7 - 2.1 mmol/L   51            Comprehensive metabolic panel [[id number]] (Abnormal)  Resulted: 02/07/17 0702, Result status: Final result    Ordering provider: Francis Fonseca MD  02/07/17 0542 Resulting lab: [facility]    Specimen Information    Type Source Collected On   Blood  02/07/17 0613          Components       Value Reference Range Flag Lab   Sodium 128 133 - 144 mmol/L L 51   Potassium 3.8 3.4 - 5.3 mmol/L  51   Chloride 94 94 - 109 mmol/L  51   Carbon Dioxide 21 20 - 32 mmol/L  51   Anion Gap 14 3 - 14 mmol/L  51   Glucose 137 70 - 99 mg/dL H 51   Urea Nitrogen 13 7 - 30 mg/dL  51   Creatinine 0.71 0.66 - 1.25 mg/dL  51   GFR Estimate -- >60 mL/min/1.7m2  51   Result:         >90  Non  GFR Calc     GFR Estimate If Black -- >60 mL/min/1.7m2  51   Result:         >90   GFR Calc     Calcium 6.7 8.5 - 10.1 mg/dL L 51   Result:     Bilirubin Total 2.4 0.2 - 1.3 mg/dL H 51   Albumin 2.6 3.4 - 5.0 g/dL L 51   Protein Total 6.0 6.8 - 8.8 g/dL L 51   Alkaline Phosphatase 116 40 - 150 U/L  51    0 - 70 U/L H 51    0 - 45 U/L H 51            Magnesium [[id number]]  Resulted: 02/07/17 0702, Result status: Final result    Ordering provider: Francis Fonseca MD  02/07/17 0542 Resulting lab: [facility]    Specimen Information    Type Source Collected On   Blood  02/07/17 0613          Components       Value Reference Range Flag Lab   Magnesium 1.6 1.6 - 2.3 mg/dL  51            INR [[id number]] (Abnormal)  Resulted: 02/07/17 0638, Result status: Final result    Ordering provider: Francis Fonseca MD  02/07/17 0542 Resulting lab: [facility]    Specimen Information    Type Source Collected On   Blood  02/07/17 0613          Components       Value Reference Range Flag Lab   INR 1.42 0.86 - 1.14  H 51            CBC with platelets differential [[id number]] (Abnormal)  Resulted:  02/07/17 0628, Result status: Final result    Ordering provider: Francis Fonseca MD  02/07/17 0542 Resulting lab: [facility]    Specimen Information    Type Source Collected On   Blood  02/07/17 0613          Components       Value Reference Range Flag Lab   WBC 24.6 4.0 - 11.0 10e9/L H 51   RBC Count 5.49 4.4 - 5.9 10e12/L  51   Hemoglobin 16.9 13.3 - 17.7 g/dL  51   Hematocrit 48.4 40.0 - 53.0 %  51   MCV 88 78 - 100 fl  51   MCH 30.8 26.5 - 33.0 pg  51   MCHC 34.9 31.5 - 36.5 g/dL  51   RDW 15.3 10.0 - 15.0 % H 51   Platelet Count 86 150 - 450 10e9/L L 51   Diff Method Automated Method   51   % Neutrophils 88.0 %  51   % Lymphocytes 5.9 %  51   % Monocytes 5.6 %  51   % Eosinophils 0.0 %  51   % Basophils 0.0 %  51   % Immature Granulocytes 0.5 %  51   Nucleated RBCs 0 0 /100  51   Absolute Neutrophil 21.6 1.6 - 8.3 10e9/L H 51   Absolute Lymphocytes 1.5 0.8 - 5.3 10e9/L  51   Absolute Monocytes 1.4 0.0 - 1.3 10e9/L H 51   Absolute Eosinophils 0.0 0.0 - 0.7 10e9/L  51   Absolute Basophils 0.0 0.0 - 0.2 10e9/L  51   Abs Immature Granulocytes 0.1 0 - 0.4 10e9/L  51   Absolute Nucleated RBC 0.0   51            Testing Performed By     Lab - Abbreviation Name Director Address Valid Date Range    45 - PGP108 MISYS Unknown Unknown 01/28/02 0000 - Present    51 - Unknown [facility] Unknown [address] [facility] [address] 12/31/14 1010 - Present    75 - Unknown [facility] Unknown [address] [facility] [address] 01/15/15 1019 - Present               Imaging Results - 3 Days (02/07/17 - 02/07/17)      US Abd Cmpl Abd/Pelvis Duplex Cmpl [[id number]]  Resulted: 02/07/17 1406, Result status: Final result    Ordering provider: Francis Fonseca MD  02/07/17 0658 Resulted by: Tiffany Murphy MD Pontolillo, John L, MD    Performed: 02/07/17 1107 - 02/07/17 1212 Resulting lab: RADIOLOGY RESULTS     Narrative:       EXAMINATION: US ABDOMEN COMPLETE WITH DOPPLER COMPLETE, 2/7/2017 12:12  PM     COMPARISON: No similar prior. Correlation is made with outside CT  abdomen dated 2/6/2017.    HISTORY: Pancreatitis, with splenic and possible portal vein  thrombosis    TECHNIQUE: The abdomen was scanned in standard fashion with  specialized ultrasound transducer(s) using both gray-scale, color  Doppler, and spectral flow techniques.    Findings:    Exam significantly limited by fatty liver. Specifically, Doppler  evaluation of hepatic vasculature is limited and likely unreliable.    Liver: The liver demonstrates increased echogenicity diffusely,  resulting in significant loss of ultrasound beam penetration. No  evidence of a focal hepatic mass.     Extrahepatic portal vein flow is antegrade, measuring 36 cm/sec.  Right portal vein flow is antegrade, measuring 10 cm/sec.  Left portal vein not visualized.    Flow in the hepatic artery is towards the liver and:  113 cm/sec peak systolic  0.45 resistive index.     The splenic vein is not seen.  The left, middle, and right hepatic  veins are nonvisualized centrally, however are patent peripherally  with flow towards the IVC. The visualized IVC is patent with flow  towards the heart.   The aorta is not dilated.    Gallbladder: Echogenic layering sludge in the gallbladder. No evidence  of discrete cholelithiasis. No gallbladder wall thickening or  pericholecystic fluid. Negative sonographic Mitchell's sign.    Bile Ducts: Both the intra- and extrahepatic biliary system are of  normal caliber.  The common bile duct measures 4 mm in diameter.    Pancreas: Not visualized.    Kidneys: Both kidneys are of normal echotexture, without mass or  hydronephrosis.   The craniocaudal dimensions are: right- 12.8 cm,  left- 12.6 cm.    Spleen: The spleen is not visualized.     Fluid: There is trace ascites.      Impression:       Impression:   1.  The vasculature seen containing thrombus on  the comparison outside  CT, the splenic vein and proximal extrahepatic portal vein, are not  visualized on the current exam due to technical factors as above.   2.  Gallbladder sludge.  3.  Marked hepatic steatosis.  4.  Trace ascites.    I have personally reviewed the examination and initial interpretation  and I agree with the findings.    DOUG PANDEY MD    Specimen Information    Type Source Collected On                  Testing Performed By     Lab - Abbreviation Name Director Address Valid Date Range    104 - Rad [facility] RADIOLOGY RESULTS Unknown Unknown 02/16/05 1553 - Present            Encounter-Level Documents:     There are no encounter-level documents.      Order-Level Documents:     There are no order-level documents.

## 2017-02-07 NOTE — ANESTHESIA CARE TRANSFER NOTE
Patient: Isaiah Hurst    Procedure(s):  with esophageal brushing - Wound Class: II-Clean Contaminated    Diagnosis: GI bleed  Diagnosis Additional Information: No value filed.    Anesthesia Type:   MAC     Note:  Airway :Room Air  Patient transferred to:Phase II  Comments: Sedation as noted for procedure. Tolerated anesthesia well. AAO with resp status back to baseline.      Vitals: (Last set prior to Anesthesia Care Transfer)    CRNA VITALS  2/7/2017 1327 - 2/7/2017 1412      2/7/2017             Resp Rate (set): 12                Electronically Signed By: BRODY Miranda CRNA  February 7, 2017  2:12 PM

## 2017-02-07 NOTE — DISCHARGE SUMMARY
[facility]  Discharge Summary    Isaiah Hurst MRN# [id number]   YOB: 1980 Age: 36 year old     Date of Admission:  2/7/2017  Date of Discharge:  2/7/2017  Admitting Physician:  Francis Fonseca MD  Discharge Physician:  Bernardo Carrillo MD and Michelle Jaramillo CNP (pager [phone])  Discharging Service:  Internal Medicine     Primary Provider: Lore Arellano          Brief History of Illness:     Isaiah Hurst is a 36 year old male with history of ETOH abuse who presents with 3 day of diffuse abdominal pain, nausea, vomiting, coffee ground emesis and melena, and found to have necrotizing pancreatitis.            Admitting Hospital To Do:   1. GI Consult  2. Serial Labs as needed  3. Follow up from biopsy taken today via EGD for esophageal candidasis          Discharge Diagnosis:   1. Necrotizing Pancreatitis  2. Alcoholic Gastritis, Portal HTN  3. Esophageal Candidiasis  4. ETOH abuse and withdrawal  5. Acute Pain related to pancreatitis              Discharge Disposition:   Discharged to [facility] [place] medicine.           Condition on Discharge:   Discharge condition: Stable to Transfer to [facility]   Code status on discharge: Full Code           Procedures:   1. EGD 2/7:   Upper GI Endoscopy      [address] [address] [phone]     Endoscopy Department   _______________________________________________________________________________   Patient Name: Isaiah Hurst       Procedure Date: 2/7/2017 1:08 PM   MRN: [id number]                       Account Number: [id number]   YOB: 1980             Admit Type: Inpatient   Age: 36                                Gender: Male   Note Status: Finalized                Attending MD: Guru Staples,   Total Sedation Time:                     _______________________________________________________________________________       Procedure:           Upper GI  endoscopy   Indications:         Coffee-ground emesis, Melena                        37 yo male h/o alcohol abuse, HTN, depression, anxiety                        transferred from [place], [place] where he presented with                        worsening abdominal pain for past 3 days. Â Found to have                        pancreatitis. Also had coffee ground emesis and melena                        which resolved. No Hb drop.Of note he has never                        undergone an upper endoscopy in the past. EGD for                        variceal screening and to evaluate for causes for upper                        GI bleeding.   Providers:           Guru Staples, Robyn Drew RN, Nadya Garcia MD   Referring MD:        Michelle Jaramillo, Bernardo Carrillo   Medicines:           Monitored Anesthesia Care   Complications:       No immediate complications.   _______________________________________________________________________________   Procedure:           Pre-Anesthesia Assessment:                        - Prior to the procedure, a History and Physical was                        performed, and patient medications, allergies and                        sensitivities were reviewed. The patient's tolerance of                        previous anesthesia was reviewed.                        - The risks and benefits of the procedure and the                        sedation options and risks were discussed with the                        patient. All questions were answered and informed                        consent was obtained.                        - Patient identification and proposed procedure were                        verified prior to the procedure by the physician and the                        nurse. The procedure was verified in the procedure room.                        - Pre-procedure physical examination revealed no                        contraindications to sedation.                         - ASA Grade Assessment: II - A patient with mild                        systemic disease.                        - Airway Examination: normal oropharyngeal airway and                        neck mobility.                        - Abdominal Examination: abdomen tender.                        - CV Examination: normal and RRR, no murmurs, no S3 or                        S4.                        After obtaining informed consent, the endoscope was                        passed under direct vision. Throughout the procedure,                        the patient's blood pressure, pulse, and oxygen                        saturations were monitored continuously. The Endoscope                        was introduced through the mouth, and advanced to the                        second part of duodenum. The upper GI endoscopy was                        accomplished with ease. The patient tolerated the                        procedure well.                                                                                     Findings:        The Z-line was regular and was found 38 cm from the incisors. Patchy        adherent curdy white exudates were present in lower third of esophagus        suspicious for candidal esophagitis. Brushing was performed to exclude        candida. No esophageal varices        Diffuse moderately erythematous mucosa without bleeding was found in the        entire examined stomach. No hematin identfied in the stomach.No gastric        varices were visualized        The examined duodenum was normal.                                                                                     Impression:          - No endoscopic evidence of active upper GI bleed.                        - No esophageal or gastric varices were identified                        - Diffusely erythrematous mucosa secondary to alcholic                        gastritis and mild portal hypertensive gastropathy was                         seen                        - Normal duodenum   Recommendation:      - [facility] for [facility].                        - Clear liquid diet today as tolerated .                        - Stop octreotide, start omeprazole 20 mg BID, when he                        tolerates oral intake                        - Await pathology from esophageal brushing. Will                        empirically start fluconazole loading dose 400 mg                        followed by 200 mg/d for 14 days, stop if brushing                        negative.                        - Continue IV hydration with LR and pain control for                        pancreatitis.                        - Inpatient panc-bili consult team will follow patient                                                                                       ____________________   Guru Staples,   2/7/2017 5:27 PM     ____________________   Guru Staples,   2/7/2017 5:27 PM   Number of Addenda: 0     Note Initiated On: 2/7/2017 1:08 PM   Scope In: 12:00:00 AM   Scope Out: 12:00:00 AM          2. Liver US 2/7:   EXAMINATION: US ABDOMEN COMPLETE WITH DOPPLER COMPLETE, 2/7/2017 12:12  PM       COMPARISON: No similar prior. Correlation is made with outside CT abdomen dated 2/6/2017.     HISTORY: Pancreatitis, with splenic and possible portal veinthrombosis     TECHNIQUE: The abdomen was scanned in standard fashion with specialized ultrasound transducer(s) using both gray-scale, color Doppler, and spectral flow techniques.     Findings:     Exam significantly limited by fatty liver. Specifically, Doppler evaluation of hepatic vasculature is limited and likely unreliable.     Liver: The liver demonstrates increased echogenicity diffusely, resulting in significant loss of ultrasound beam penetration. No evidence of a focal hepatic mass.       Extrahepatic portal vein flow is antegrade, measuring 36 cm/sec. Right portal vein flow is  antegrade, measuring 10 cm/sec. Left portal vein not visualized.     Flow in the hepatic artery is towards the liver and: 113 cm/sec peak systolic 0.45 resistive index.       The splenic vein is not seen.  The left, middle, and right hepatic veins are nonvisualized centrally, however are patent peripherally with flow towards the IVC. The visualized IVC is patent with flow towards the heart.   The aorta is not dilated.     Gallbladder: Echogenic layering sludge in the gallbladder. No evidence of discrete cholelithiasis. No gallbladder wall thickening or pericholecystic fluid. Negative sonographic Mitchell's sign.     Bile Ducts: Both the intra- and extrahepatic biliary system are of normal caliber.  The common bile duct measures 4 mm in diameter.     Pancreas: Not visualized.     Kidneys: Both kidneys are of normal echotexture, without mass or hydronephrosis.   The craniocaudal dimensions are: right- 12.8 cm,  left- 12.6 cm.     Spleen: The spleen is not visualized.       Fluid: There is trace ascites.                                                                       Impression:    1.  The vasculature seen containing thrombus on the comparison outside CT, the splenic vein and proximal extrahepatic portal vein, are not visualized on the current exam due to technical factors as above.    2.  Gallbladder sludge.  3.  Marked hepatic steatosis.  4.  Trace ascites.     I have personally reviewed the examination and initial interpretation  and I agree with the findings.     DOUG PANDEY MD            Discharge Medications:     Current Discharge Medication List      START taking these medications    Details   LORazepam (ATIVAN) 1 MG tablet For Score   greater than or equal to  20-give 2-4 mg-repeat assessment/vitals in 30 min.  For Score 15-19-give 1-2 mg & repeat assessment/vitals in 1 hr  For Score 8-14- give 1-2 mg & repeat assessment/vitals in 2-4 hrs.  For Score less than 8-do not give & repeat assessment/vitals in  4 hrs.  For Score less than 8 x 2 do not give &repeat assessment/vitals in 8 hrs.  Qty: 60 tablet    Associated Diagnoses: Alcohol dependence with withdrawal with complication (H)      !! ondansetron (ZOFRAN) 2 MG/ML SOLN injection Inject 2 mLs (4 mg) into the vein every 6 hours as needed for nausea or vomiting  Qty: 15 mL    Associated Diagnoses: Acute necrotizing pancreatitis      !! ondansetron (ZOFRAN) 2 MG/ML SOLN injection Inject 2 mLs (4 mg) into the vein every 30 minutes as needed for nausea or vomiting  Qty: 15 mL    Associated Diagnoses: Alcohol dependence with withdrawal with complication (H)      prochlorperazine (COMPAZINE) 5 MG/ML injection Inject 1-2 mLs (5-10 mg) into the vein every 6 hours as needed for nausea or vomiting  Qty: 120 mL    Associated Diagnoses: Acute necrotizing pancreatitis      pantoprazole (PROTONIX) 40 mg IV push injection Inject 40 mg into the vein 2 times daily  Qty: 30 each    Associated Diagnoses: Alcoholic gastritis with hemorrhage, unspecified chronicity      lactated ringers infusion Inject 200 mL/hr into the vein continuous  Qty: 1000 mL, Refills: 0    Associated Diagnoses: Acute necrotizing pancreatitis      fluconazole (DIFLUCAN) 200-0.9 MG/100ML-% infusion Inject 100 mLs (200 mg) into the vein every 24 hours  Qty: 3000 mL    Associated Diagnoses: Esophageal candidiasis (H)      heparin  drip 25,000 units in 0.45% NaCl 250 mL (see additional administration details for dose) Inject 0-3,500 Units/hr into the vein continuous    Associated Diagnoses: Splenic vein thrombosis       !! - Potential duplicate medications found. Please discuss with provider.      CONTINUE these medications which have CHANGED    Details   FLUoxetine (PROZAC) 20 MG capsule Take 1 capsule (20 mg) by mouth daily  Qty: 30 capsule    Associated Diagnoses: Alcohol dependence with withdrawal with complication (H)      propranolol (INDERAL) 10 MG tablet Take 1 tablet (10 mg) by mouth 3 times daily  Qty: 90  tablet    Associated Diagnoses: Alcohol dependence with withdrawal with complication (H)         STOP taking these medications       hydrOXYzine (ATARAX) 50 MG tablet Comments:   Reason for Stopping:                     Consultations:   Consultation during this admission received from Gastroenterology              Hospital Course:   1. Necrotizing pancreatitis - Likely acute on chronic secondary to ETOH, lipase is only mildly elevated, however CT scan at the OSH showed substantial necrosis of the tail of the pancreas without signs of infection. Is significantly hemo-concentrated.  Splenic vein thrombosis read at OSH suggests there may be some chronicity.  Per GI review of OSH CT scan feel that he has peripancreatic fluid collection without necrosis.  Radiology at [facility] have reviewed CT scan (official report pending) and feels that the splenic vein thrombosis noted on the OSH CT scan is likely acute and therefore he has been started on IV heparin infusion.  We would recommend radiology at [facility] reviewing CT scan to further evaluate splenic thrombus and portal vein thrombus.  An abdominal US was done 2/7 which was limited due to fatty liver.  Results are documented above.  Given his reported history of melena and coffee ground emesis, he underwent an EGD on 2/7 which did not show evidence of active bleeding but did show inflammation concerning for alcoholic gastritis and possible portal hypertension.  In addition, there was a concern for esophageal candidiasis.  He will be given a loading dose of 400 mg IV Fluconazole and it is recommended by the GI team here that he be continued on I Fluconazole 200 mg IV q 24 hours starting 2/8 for treatment.  His biopsies from EGD today are pending and if negative, the Fluconazole can be discontinued.  Otherwise, it should be continued for 2 weeks.  His pain was controlled with IV Dilaudid q 2 hours.  He received IVF boluses for dehydration and is continued on lactated  ringers at 200 ml/hour.  We recommend that he be given a banana bag daily for alcohol withdrawal.  He has 1 L today prior to transfer.  He should have daily CMP with lipase trends as needed.     2. Elevated Lactate - Likely 2/2 liver disease, pancreatitis and dehydration.  His admission lactate was 2.1 and an recheck after EGD it was 3.1.  Although somewhat stable, this is likely indication that he continues to be hypovolemic therefore he will receive an additional 1L of NS prior to transfer.  A repeat lactate should be trended.     3. Splenic Vein Thrombosis - Seen on OSH CT scan.  CT scan is in process of being read by radiology at [facility], however, official read is pending.  Per GI staff and radiology preliminary read, concern that the splenic thrombosis is acute and treatment should be started.  A heparin gtt (high intensity) has been initiated.  Of note, he did have an EGD today that was negative for acute bleeding. His heparin 10A levels should be trended. We recommend a GI consult at [facility] to follow along with this.  Please follow up in [facility] for the CT read from [facility] which should be completed on 2/8.     3.  Concern for UGIB - Patient presented with a 3 day history of coffee ground emesis and melena and a several month history of intermittent sharp epigastric pain that improves with eating. GI team was consulted(as above) and he underwent an EGD 2/7 which was negative for acute source of bleeding.  It did show probably alcoholic gastritis and possible portal HTN (official read above).  He was maintained initially on a pantoprazole and Octreotide gtt, however, these were discontinued.  He is being discharged on Pantoprazole 40 mg IV q 12 hours.  His Hgb has been stable 16.9 and should be trended upon transfer.     4. ETOH abuse - Patient has a long standing history, has seen a  and was preparing to fill out a Rule 25.  Drinks 1 pint to 1 L of vodka daily, and has daily withdrawal symptoms but no  history of seizures.  Last drink 2/6/17.  He received 1 L of a banana bag and should continue this for another 2 days until he can tolerate PO.  A chemical dependency consult is highly recommended. He has received only one dose of Ativan today for withdrawal symptoms and is stable for transfer.     5. Hyponatremia - Na 129 at OSH and rechecked at 128, likely hypovolemic related to poor PO intake and repeated emesis vs pancreatitis.  Would recommend following Na levels.  He is continued on LR at 200mL/hour.     6. Transaminitis - at OSH /, likely secondary to ETOH abuse.  Bilirubin is mildly elevated at 2.3, without signs of obstruction.  Liver US completed today (see above).  Would recommend trending a CMP.    7. Leukocytosis - WBC 24.6.  Afebrile.  Likely inflammation 2/2 pancreatitis.  Was started on IV Ceftriaxone when concerned about UGIB, however, GI has recommended this be stopped.  UA negative.  Blood cultures sent.    8. History of Anxiety & Depression - Mood stable although experiencing mild alcohol withdrawal (has received 1 mg of Ativan today).  Managed on PTA Propanalol 10 mg PO TID, Hydroxyzine 50 mg PO q 6 hours PRN and Fluoxetine 20 mg PO daily. His Propanalol and Fluoxetine have been continued.             Final Day of Progress before Discharge:       Physical Exam:  Blood pressure 167/114, pulse 100, temperature 97.1  F (36.2  C), temperature source Oral, resp. rate 18, SpO2 92 %.  GENERAL: Alert and oriented x 3. NAD. Appears tremulous   HEENT: Anicteric sclera. PERRL. Mucous membranes moist and without lesions.   CV: RRR. S1, S2. No murmurs appreciated.   RESPIRATORY: Effort normal. Lungs CTAB with no wheezing, rales, rhonchi.   GI: Abdomen soft and non distended with normoactive bowel sounds present in all quadrants. Diffuse tenderness but without rebound, guarding.   MUSCULOSKELETAL: No joint swelling or tenderness. Moves all extremities.   NEUROLOGICAL: No focal deficits. Strength  5/5 bilaterally in upper and lower extremities.   EXTREMITIES: No peripheral edema. Intact bilateral pedal pulses.   SKIN: No jaundice. No rashes.        Data:  All laboratory data reviewed             Significant Results:     Results for orders placed or performed during the hospital encounter of 02/07/17   UPPER GI ENDOSCOPY   Result Value Ref Range    Upper GI Endoscopy       [facility] [place]  [address] [place] [address] [phone]     Endoscopy Department  _______________________________________________________________________________  Patient Name: Isaiah Hurst       Procedure Date: 2/7/2017 1:08 PM  MRN: [id number]                       Account Number: [id number]  YOB: 1980             Admit Type: Inpatient  Age: 36                                Gender: Male  Note Status: Finalized                Attending MD: Guru Staples,   Total Sedation Time:                    _______________________________________________________________________________     Procedure:           Upper GI endoscopy  Indications:         Coffee-ground emesis, Melena                       35 yo male h/o alcohol abuse, HTN, depression, anxiety                        transferred from [place], [place] where he presented with                        worsening abdominal pain for past 3 days. Â Found to have                        pancreatitis. A lso had coffee ground emesis and melena                        which resolved. No Hb drop.Of note he has never                        undergone an upper endoscopy in the past. EGD for                        variceal screening and to evaluate for causes for upper                        GI bleeding.  Providers:           Robyn Hartman, RN, Nadya Garcia MD  Referring MD:        Michelle Jaramillo, Bernardo Carrillo  Medicines:           Monitored Anesthesia Care  Complications:       No immediate  complications.  _______________________________________________________________________________  Procedure:           Pre-Anesthesia Assessment:                       - Prior to the procedure, a History and Physical was                        performed, and patient medications, allergies and                        sensitivities were reviewed. The patient's tolerance of                        previous anesthesia was reviewed.                       - The r isks and benefits of the procedure and the                        sedation options and risks were discussed with the                        patient. All questions were answered and informed                        consent was obtained.                       - Patient identification and proposed procedure were                        verified prior to the procedure by the physician and the                        nurse. The procedure was verified in the procedure room.                       - Pre-procedure physical examination revealed no                        contraindications to sedation.                       - ASA Grade Assessment: II - A patient with mild                        systemic disease.                       - Airway Examination: normal oropharyngeal airway and                        neck mobility.                       - Abdominal Examination: abdomen tender.                       - CV Examination: normal and RRR, no murmurs, no S3 or                        S4.                        After obtaining informed consent, the endoscope was                        passed under direct vision. Throughout the procedure,                        the patient's blood pressure, pulse, and oxygen                        saturations were monitored continuously. The Endoscope                        was introduced through the mouth, and advanced to the                        second part of duodenum. The upper GI endoscopy was                        accomplished with ease.  The patient tolerated the                        procedure well.                                                                                   Findings:       The Z-line was regular and was found 38 cm from the incisors. Patchy        adherent curdy white exudates were present in lower third of esophagus        suspicious for candidal esophagitis. Brushing was performed to exclude        candida. No esophageal varices       Diffuse moderately erythematous mucosa without bleeding was found in the        e ntire examined stomach. No hematin identfied in the stomach.No gastric        varices were visualized       The examined duodenum was normal.                                                                                   Impression:          - No endoscopic evidence of active upper GI bleed.                       - No esophageal or gastric varices were identified                       - Diffusely erythrematous mucosa secondary to alcholic                        gastritis and mild portal hypertensive gastropathy was                        seen                       - Normal duodenum  Recommendation:      - Return patient to [facility] for ongoing care.                       - Clear liquid diet today as tolerated .                       - Stop octreotide, start omeprazole 20 mg BID, when he                        tolerates oral intake                       - Await pathology from esophageal brushing. Will                        empirically start fluconazole loading dose 400 mg                         followed by 200 mg/d for 14 days, stop if brushing                        negative.                       - Continue IV hydration with LR and pain control for                        pancreatitis.                       - Inpatient panc-bili consult team will follow patient                                                                                     ____________________  Guru Staples,   2/7/2017 5:27  PM    ____________________  Guru Staples,   2/7/2017 5:27 PM  Number of Addenda: 0    Note Initiated On: 2/7/2017 1:08 PM  Scope In: 12:00:00 AM  Scope Out: 12:00:00 AM     US Abd Cmpl Abd/Pelvis Duplex Cmpl    Narrative    EXAMINATION: US ABDOMEN COMPLETE WITH DOPPLER COMPLETE, 2/7/2017 12:12  PM     COMPARISON: No similar prior. Correlation is made with outside CT  abdomen dated 2/6/2017.    HISTORY: Pancreatitis, with splenic and possible portal vein  thrombosis    TECHNIQUE: The abdomen was scanned in standard fashion with  specialized ultrasound transducer(s) using both gray-scale, color  Doppler, and spectral flow techniques.    Findings:    Exam significantly limited by fatty liver. Specifically, Doppler  evaluation of hepatic vasculature is limited and likely unreliable.    Liver: The liver demonstrates increased echogenicity diffusely,  resulting in significant loss of ultrasound beam penetration. No  evidence of a focal hepatic mass.     Extrahepatic portal vein flow is antegrade, measuring 36 cm/sec.  Right portal vein flow is antegrade, measuring 10 cm/sec.  Left portal vein not visualized.    Flow in the hepatic artery is towards the liver and:  113 cm/sec peak systolic  0.45 resistive index.     The splenic vein is not seen.  The left, middle, and right hepatic  veins are nonvisualized centrally, however are patent peripherally  with flow towards the IVC. The visualized IVC is patent with flow  towards the heart.   The aorta is not dilated.    Gallbladder: Echogenic layering sludge in the gallbladder. No evidence  of discrete cholelithiasis. No gallbladder wall thickening or  pericholecystic fluid. Negative sonographic Mitchell's sign.    Bile Ducts: Both the intra- and extrahepatic biliary system are of  normal caliber.  The common bile duct measures 4 mm in diameter.    Pancreas: Not visualized.    Kidneys: Both kidneys are of normal echotexture, without mass or  hydronephrosis.   The  craniocaudal dimensions are: right- 12.8 cm,  left- 12.6 cm.    Spleen: The spleen is not visualized.     Fluid: There is trace ascites.      Impression    Impression:   1.  The vasculature seen containing thrombus on the comparison outside  CT, the splenic vein and proximal extrahepatic portal vein, are not  visualized on the current exam due to technical factors as above.   2.  Gallbladder sludge.  3.  Marked hepatic steatosis.  4.  Trace ascites.    I have personally reviewed the examination and initial interpretation  and I agree with the findings.    DOUG PANDEY MD   Comprehensive metabolic panel   Result Value Ref Range    Sodium 128 (L) 133 - 144 mmol/L    Potassium 3.8 3.4 - 5.3 mmol/L    Chloride 94 94 - 109 mmol/L    Carbon Dioxide 21 20 - 32 mmol/L    Anion Gap 14 3 - 14 mmol/L    Glucose 137 (H) 70 - 99 mg/dL    Urea Nitrogen 13 7 - 30 mg/dL    Creatinine 0.71 0.66 - 1.25 mg/dL    GFR Estimate >90  Non  GFR Calc   >60 mL/min/1.7m2    GFR Estimate If Black >90   GFR Calc   >60 mL/min/1.7m2    Calcium 6.7 (L) 8.5 - 10.1 mg/dL    Bilirubin Total 2.4 (H) 0.2 - 1.3 mg/dL    Albumin 2.6 (L) 3.4 - 5.0 g/dL    Protein Total 6.0 (L) 6.8 - 8.8 g/dL    Alkaline Phosphatase 116 40 - 150 U/L     (H) 0 - 70 U/L     (H) 0 - 45 U/L   CBC with platelets differential   Result Value Ref Range    WBC 24.6 (H) 4.0 - 11.0 10e9/L    RBC Count 5.49 4.4 - 5.9 10e12/L    Hemoglobin 16.9 13.3 - 17.7 g/dL    Hematocrit 48.4 40.0 - 53.0 %    MCV 88 78 - 100 fl    MCH 30.8 26.5 - 33.0 pg    MCHC 34.9 31.5 - 36.5 g/dL    RDW 15.3 (H) 10.0 - 15.0 %    Platelet Count 86 (L) 150 - 450 10e9/L    Diff Method Automated Method     % Neutrophils 88.0 %    % Lymphocytes 5.9 %    % Monocytes 5.6 %    % Eosinophils 0.0 %    % Basophils 0.0 %    % Immature Granulocytes 0.5 %    Nucleated RBCs 0 0 /100    Absolute Neutrophil 21.6 (H) 1.6 - 8.3 10e9/L    Absolute Lymphocytes 1.5 0.8 - 5.3 10e9/L     Absolute Monocytes 1.4 (H) 0.0 - 1.3 10e9/L    Absolute Eosinophils 0.0 0.0 - 0.7 10e9/L    Absolute Basophils 0.0 0.0 - 0.2 10e9/L    Abs Immature Granulocytes 0.1 0 - 0.4 10e9/L    Absolute Nucleated RBC 0.0    INR   Result Value Ref Range    INR 1.42 (H) 0.86 - 1.14   Magnesium   Result Value Ref Range    Magnesium 1.6 1.6 - 2.3 mg/dL   Lactic acid whole blood   Result Value Ref Range    Lactic Acid 2.1 0.7 - 2.1 mmol/L   Amylase   Result Value Ref Range    Amylase 82 30 - 110 U/L   Lipase   Result Value Ref Range    Lipase 814 (H) 73 - 393 U/L   CBC with platelets   Result Value Ref Range    WBC 22.7 (H) 4.0 - 11.0 10e9/L    RBC Count 5.30 4.4 - 5.9 10e12/L    Hemoglobin 16.6 13.3 - 17.7 g/dL    Hematocrit 47.8 40.0 - 53.0 %    MCV 90 78 - 100 fl    MCH 31.3 26.5 - 33.0 pg    MCHC 34.7 31.5 - 36.5 g/dL    RDW 15.5 (H) 10.0 - 15.0 %    Platelet Count 68 (L) 150 - 450 10e9/L   Basic metabolic panel   Result Value Ref Range    Sodium 127 (L) 133 - 144 mmol/L    Potassium 4.1 3.4 - 5.3 mmol/L    Chloride 94 94 - 109 mmol/L    Carbon Dioxide 19 (L) 20 - 32 mmol/L    Anion Gap 14 3 - 14 mmol/L    Glucose 104 (H) 70 - 99 mg/dL    Urea Nitrogen 15 7 - 30 mg/dL    Creatinine 0.73 0.66 - 1.25 mg/dL    GFR Estimate >90  Non  GFR Calc   >60 mL/min/1.7m2    GFR Estimate If Black >90   GFR Calc   >60 mL/min/1.7m2    Calcium 6.6 (L) 8.5 - 10.1 mg/dL   Phosphorus   Result Value Ref Range    Phosphorus 2.7 2.5 - 4.5 mg/dL   UA with Microscopic reflex to Culture   Result Value Ref Range    Color Urine Yellow     Appearance Urine Clear     Glucose Urine Negative NEG mg/dL    Bilirubin Urine Negative NEG    Ketones Urine Negative NEG mg/dL    Specific Gravity Urine >1.050 (H) 1.003 - 1.035    Blood Urine Trace (A) NEG    pH Urine 6.0 5.0 - 7.0 pH    Protein Albumin Urine 30 (A) NEG mg/dL    Urobilinogen mg/dL 2.0 0.0 - 2.0 mg/dL    Nitrite Urine Negative NEG    Leukocyte Esterase Urine Negative NEG     Source Midstream Urine     WBC Urine 4 (H) 0 - 2 /HPF    RBC Urine 2 0 - 2 /HPF    Transitional Epi <1 0 - 1 /HPF    Mucous Urine Present (A) NEG /LPF   Lactic acid level STAT   Result Value Ref Range    Lactic Acid 3.1 (H) 0.7 - 2.1 mmol/L   GI Pancreaticobiliary Adult IP Consult: Patient to be seen: ASAP within 4 hrs; Call back #: [phone] or page [phone] if not within 15 minutes; Necrotizing pancreatitis, coffee ground emesis, melena, etoh abuse; Consultant may enter orders: Yes    Narrative    Nadya Garcia MD     2/7/2017  5:01 PM        GASTROENTEROLOGY CONSULTATION      Date of Admission:  2/7/2017  Consulting physician: Michelle Jaramillo NP          ASSESSMENT AND RECOMMENDATIONS:     Assessment:    35 yo male h/o alcohol abuse, HTN, depression, anxiety coming as    Transfer from [place], [place] where he presented with worsening   abdominal pain for past 3 days.   -He was diagnosed with pancreatitis based on lipase of around   1000, pain and CT findings. On reviewing imaging CT abdomen w   contrast, he has acute peripancreatic fluid collection, no   necrosis. His BISAP 1 ( SIRS +). Most likely alcohol induced.   -He was also had possible GI bleed based on history of coffee   ground emesis and melena and it has resolved, EGD performed today   showed no varices, diffuse gastric erythema, therefore most   likely it was from Tori Mitchell tear, or slow oozing from    gastritis, portal hypertensive gastropathy.    - He was also found to have splenic vein thrombosis, extending to   portal vein, most likely acute from underlying pancreatitis, no   sure of acuity, no prior imaging to compare, but no collaterals   in recent imaging, favoring towards acute.  USG did not show any   gallstones.      Recommendations    Continue aggressive fluid resuscitation with LR, continue   maintenance fluid. Recommend starting low fat clear liquid diet   today and ADAT. Consider NJ if not tolerating oral intake in   couple of days.      Daily CBC, hematocrit, BMP, LFTs.     Recommend checking triglycerides.     Continue oral  Omeprazole 20 mg BID, stop  octreotide.     Recommend starting anticoagulation for PVT considering acute   etiology, he will need close f/u at [facility], need to be treated for   3-6 months.     Recommend getting CT reviewed by radiologist here at [facility].     Recommend getting outside records from [facility].     Alcohol cessation, will need social work consult.       Gastroenterology follow up recommendations: f/u at [facility].       Thank you for involving us in this patient's care. Please do not   hesitate to contact the GI service with any questions or   concerns. Pt care plan discussed with Dr. Staples. , GI   staff physician.    Nadya Garcia  GI fellow         Chief Complaint:   Abdominal pain         HPI:     37 yo male h/o alcohol abuse, HTN, depression, anxiety coming as    Transfer from [place], [place] where he presented with worsening   abdominal pain for past 3 days. According to him he was having   chronic pain for past 2 months on/off. For past 3 days pain   became constant, epigastric and radiating to left abdomen. He had   nausea and multiple episodes of vomiting on Saturday, he was not   able to keep anything down. He had coffee ground emesis on   Saturday initially and it resolved yesterday. He denied any fever   but feeling cold. He never had this problem before. He was   drinking around 1 litre of vodka daily, last drink yesterday. He   smokes around 0.5 pack per day. He was told around 2 years ago   that he has fatty liver, not aware of cirrhosis. He never had   ascites, GI bleed before. He denied any NSAIDs, not on any   anticoagulation. He denied any chest pain, SOB, palpitations.            Past Medical History:     Reviewed and edited as appropriate  Past Medical History   Diagnosis Date     Hypertension      Depressive disorder             Past Surgical History:   Reviewed and edited as appropriate    No past surgical history on file.         Previous Endoscopy:   No results found for this or any previous visit.         Social History:   Reviewed and edited as appropriate  Social History     Social History     Marital Status: Single     Spouse Name: N/A     Number of Children: N/A     Years of Education: N/A     Occupational History     Not on file.     Social History Main Topics     Smoking status: Current Every Day Smoker -- 0.25 packs/day     Smokeless tobacco: Never Used     Alcohol Use: Yes     Drug Use: No     Sexual Activity: Not on file     Other Topics Concern     Not on file     Social History Narrative            Family History:   Reviewed and edited as appropriate  Brother  from alcohol liver disease.       Allergies:   Reviewed and edited as appropriate     Allergies   Allergen Reactions     Naproxen Rash            Medications:     Current Facility-Administered Medications   Medication     [START ON 2017] influenza quadrivalent (PF) vacc age 3 yrs   and older (FLUZONE or Flulaval) injection 0.5 mL     naloxone (NARCAN) injection 0.1-0.4 mg     ondansetron (ZOFRAN-ODT) ODT tab 4 mg    Or     ondansetron (ZOFRAN) injection 4 mg     LORazepam (ATIVAN) tablet 1-4 mg     HYDROmorphone (PF) (DILAUDID) injection 0.5-1 mg     [START ON 2017] pneumococcal vaccine (PNEUMOVAX 23-carlos)   injection 0.5 mL     potassium chloride SA (K-DUR/KLOR-CON M) CR tablet 20-40 mEq     potassium chloride (KLOR-CON) Packet 20-40 mEq     potassium chloride 10 mEq in 100 mL intermittent infusion     potassium chloride 10 mEq in 100 mL intermittent infusion with   10 mg lidocaine     potassium chloride 20 mEq in 50 mL intermittent infusion     magnesium sulfate 4 g in 100 mL sterile water (premade)     sodium phosphate 15 mmol in D5W intermittent infusion     sodium phosphate 20 mmol in D5W intermittent infusion     sodium phosphate 25 mmol in D5W intermittent infusion     cefTRIAXone (ROCEPHIN) 1 g vial to attach  to  mL bag for   ADULTS or NS 50 mL bag for PEDS     ondansetron (ZOFRAN-ODT) ODT tab 4 mg    Or     ondansetron (ZOFRAN) injection 4 mg     prochlorperazine (COMPAZINE) injection 5-10 mg     meperidine (DEMEROL) injection 12.5 mg     naloxone (NARCAN) injection 0.1-0.4 mg     pantoprazole (PROTONIX) 40 mg IV push injection     lactated ringers infusion     fluconazole (DIFLUCAN) intermittent infusion 400 mg in NaCl     [START ON 2/8/2017] fluconazole (DIFLUCAN) intermittent   infusion 200 mg in NaCl             Review of Systems:   A complete review of systems was performed and is negative except   as noted in the HPI           Physical Exam:     /110 mmHg  Temp(Src) 95.2  F (35.1  C) (Oral)  Resp 16    SpO2 93%  Wt:   Wt Readings from Last 2 Encounters:   01/09/17 99.139 kg (218 lb 9 oz)   02/16/16 87.091 kg (192 lb)        Constitutional: drowsy, not dyspneic/diaphoretic, no acute   distress, AAO x 3.   Eyes: Sclera anicteric/ no pallor  Ears/nose/mouth/throat: Normal oropharynx without ulcers or   exudate, mucus membranes moist,  CV: Normal S1, S2, no murmurs.  Respiratory: clear to auscultation b/l, no murmur  Abd: Nondistended, +bs, tenderness in mid and left abdomen, no   peritoneal signs  Skin: warm, perfused, no jaundice  Neuro: AAO x 3, no focal motor sensory deficits           Data:   Labs and imaging below were independently reviewed and   interpreted    BMP  Recent Labs  Lab 02/07/17 0613   *   POTASSIUM 3.8   CHLORIDE 94   PARMINDER 6.7*   CO2 21   BUN 13   CR 0.71   *     CBC  Recent Labs  Lab 02/07/17 0613   WBC 24.6*   RBC 5.49   HGB 16.9   HCT 48.4   MCV 88   MCH 30.8   MCHC 34.9   RDW 15.3*   PLT 86*     INR  Recent Labs  Lab 02/07/17 0613   INR 1.42*     LFTs  Recent Labs  Lab 02/07/17 0613   ALKPHOS 116   *   *   BILITOTAL 2.4*   PROTTOTAL 6.0*   ALBUMIN 2.6*      PANC  Recent Labs  Lab 02/07/17  0613   LIPASE 814*   AMYLASE 82       Imaging: reviewed        ABO/Rh type and screen   Result Value Ref Range    ABO O     RH(D)  Pos     Antibody Screen Neg     Test Valid Only At       [facility]    Specimen Expires 02/10/2017    Fungus Culture, non-blood   Result Value Ref Range    Specimen Description Esophageal BRUSHING     Culture Micro Pending     Micro Report Status Pending       Recent Results (from the past 48 hour(s))   US Abd Cmpl Abd/Pelvis Duplex Cmpl    Narrative    EXAMINATION: US ABDOMEN COMPLETE WITH DOPPLER COMPLETE, 2/7/2017 12:12  PM     COMPARISON: No similar prior. Correlation is made with outside CT  abdomen dated 2/6/2017.    HISTORY: Pancreatitis, with splenic and possible portal vein  thrombosis    TECHNIQUE: The abdomen was scanned in standard fashion with  specialized ultrasound transducer(s) using both gray-scale, color  Doppler, and spectral flow techniques.    Findings:    Exam significantly limited by fatty liver. Specifically, Doppler  evaluation of hepatic vasculature is limited and likely unreliable.    Liver: The liver demonstrates increased echogenicity diffusely,  resulting in significant loss of ultrasound beam penetration. No  evidence of a focal hepatic mass.     Extrahepatic portal vein flow is antegrade, measuring 36 cm/sec.  Right portal vein flow is antegrade, measuring 10 cm/sec.  Left portal vein not visualized.    Flow in the hepatic artery is towards the liver and:  113 cm/sec peak systolic  0.45 resistive index.     The splenic vein is not seen.  The left, middle, and right hepatic  veins are nonvisualized centrally, however are patent peripherally  with flow towards the IVC. The visualized IVC is patent with flow  towards the heart.   The aorta is not dilated.    Gallbladder: Echogenic layering sludge in the gallbladder. No evidence  of discrete cholelithiasis. No gallbladder wall thickening or  pericholecystic fluid. Negative sonographic Mitchell's sign.    Bile Ducts: Both the  intra- and extrahepatic biliary system are of  normal caliber.  The common bile duct measures 4 mm in diameter.    Pancreas: Not visualized.    Kidneys: Both kidneys are of normal echotexture, without mass or  hydronephrosis.   The craniocaudal dimensions are: right- 12.8 cm,  left- 12.6 cm.    Spleen: The spleen is not visualized.     Fluid: There is trace ascites.      Impression    Impression:   1.  The vasculature seen containing thrombus on the comparison outside  CT, the splenic vein and proximal extrahepatic portal vein, are not  visualized on the current exam due to technical factors as above.   2.  Gallbladder sludge.  3.  Marked hepatic steatosis.  4.  Trace ascites.    I have personally reviewed the examination and initial interpretation  and I agree with the findings.    DOUG PANDEY MD                Pending Results:   Unresulted Labs Ordered in the Past 30 Days of this Admission     No orders found from 12/10/2016 to 2/8/2017.                  Discharge Instructions and Follow-Up:     Discharge Procedure Orders  Reason for your hospital stay   Order Comments: You were admitted to the hospital for pancreatitis and possible GI bleeding.  You received IVF and underwent an EGD to rule out a GI bleed.     Intake and output   Order Comments: Every shift     Daily weights   Order Comments: Call Provider for weight gain of more than 2 pounds per day or 5 pounds per week.     IV access   Order Comments: Peripheral IV.     Follow Up and recommended labs and tests   Order Comments: Follow up with [facility] admitting MD upon arrival.  Recommended labs: CBC, CMP, lipase daily.  Hgb q 6 hours. INR daily for MELD calculation.     Activity - Up ad osman   Order Specific Question Answer Comments   Is discharge order? Yes      Full Code     Fall precautions     NPO   Order Specific Question Answer Comments   Is discharge order? Yes         Michelle Jaramillo [facility]  Hospitalist Service  Pager [phone]  February 7,  2017    Time spent on patient: 35 minutes total including face to face and coordinating care time reviewing current illness, any medication changes, and the care plan for today.    Discharge plan was discussed with Dr. Carrillo.

## 2017-02-07 NOTE — IP AVS SNAPSHOT
MRN:[id number]                      After Visit Summary   2/7/2017    Isaiah Hurst    MRN: [id number]           Thank you!     Thank you for choosing [facility] for your care. Our goal is always to provide you with excellent care. Hearing back from our patients is one way we can continue to improve our services. Please take a few minutes to complete the written survey that you may receive in the mail after you visit with us. Thank you!        Patient Information     Date Of Birth          1980        About your hospital stay     You were admitted on:  February 7, 2017 You last received care in the:  [address] [facility]    You were discharged on:  February 7, 2017        Reason for your hospital stay       You were admitted to the hospital for pancreatitis and possible GI bleeding.  You received IVF and underwent an EGD to rule out a GI bleed.                  Who to Call     For medical emergencies, please call 911.  For non-urgent questions about your medical care, please call your primary care provider or clinic, [phone]  For questions related to your surgery, please call your surgery clinic        Attending Provider     Provider    Francis Fonseca MD Gupta, Saurabh, MD       Primary Care Provider Office Phone # Fax #    Lore FieldsJOHN talavera [phone]        [place] [id number] [facility] [address]        After Care Instructions     Activity - Up ad osman           Daily weights       Call Provider for weight gain of more than 2 pounds per day or 5 pounds per week.            Fall precautions           IV access       Peripheral IV.            Intake and output       Every shift            NPO                 Follow-up Appointments     Follow Up and recommended labs and tests       Follow up with [facility] admitting MD upon arrival.  Recommended labs: CBC, CMP, lipase daily.  Hgb q 6 hours. INR daily for MELD calculation.              "     Pending Results     Date and Time Order Name Status Description    2017 1346 Fungus Culture, non-blood Preliminary     2017 0901 Blood culture In process     2017 0901 Blood culture In process             Statement of Approval     Ordered          17 1906  I have reviewed and agree with all the recommendations and orders detailed in this document.   EFFECTIVE NOW     Approved and electronically signed by:  Michelle Jaramillo NP             Admission Information        Provider Department Dept Phone    2017 Bernardo Carrillo MD, MD Uu U7d [phone]      Your Vitals Were     Blood Pressure Pulse Temperature    172/124 mmHg 100 100.3  F (37.9  C) (Oral)    Respirations Height Weight    20 1.778 m (5' 10\") 101.515 kg (223 lb 12.8 oz)    BMI (Body Mass Index) Pulse Oximetry       32.11 kg/m2 94%       MyChart Information     [facility]shot lets you send messages to your doctor, view your test results, renew your prescriptions, schedule appointments and more. To sign up, go to [email or web address] . Click on \"Log in\" on the left side of the screen, which will take you to the Welcome page. Then click on \"Sign up Now\" on the right side of the page.     You will be asked to enter the access code listed below, as well as some personal information. Please follow the directions to create your username and password.     Your access code is: [email or web address]  Expires: 2017  2:49 PM     Your access code will  in 90 days. If you need help or a new code, please call your [facility] clinic or [phone].        Care EveryWhere ID     This is your Care EveryWhere ID. This could be used by other organizations to access your [facility] medical records  [id number]           Review of your medicines      START taking        Dose / Directions    fluconazole 200-0.9 MG/100ML-% infusion   Commonly known as:  DIFLUCAN   Indication:  Infection due to Candida Species Fungus   Used for:  Esophageal candidiasis (H)     "    Dose:  200 mg   Start taking on:  2/8/2017   Inject 100 mLs (200 mg) into the vein every 24 hours   Quantity:  3000 mL   Refills:  0       heparin (porcine) 100-0.45 UNIT/ML-% infusion   Used for:  Splenic vein thrombosis        Dose:  0-3500 Units/hr   Inject 0-3,500 Units/hr into the vein continuous   Refills:  0       lactated ringers injection   Used for:  Acute necrotizing pancreatitis        Dose:  200 mL/hr   Inject 200 mL/hr into the vein continuous   Quantity:  1000 mL   Refills:  0       LORazepam 1 MG tablet   Commonly known as:  ATIVAN   Used for:  Alcohol dependence with withdrawal with complication (H)        For Score   greater than or equal to  20-give 2-4 mg-repeat assessment/vitals in 30 min. For Score 15-19-give 1-2 mg & repeat assessment/vitals in 1 hr For Score 8-14- give 1-2 mg & repeat assessment/vitals in 2-4 hrs. For Score less than 8-do not give & repeat assessment/vitals in 4 hrs. For Score less than 8 x 2 do not give &repeat assessment/vitals in 8 hrs.   Quantity:  60 tablet   Refills:  0       * ondansetron 2 MG/ML Soln injection   Commonly known as:  ZOFRAN   Used for:  Acute necrotizing pancreatitis        Dose:  4 mg   Inject 2 mLs (4 mg) into the vein every 6 hours as needed for nausea or vomiting   Quantity:  15 mL   Refills:  0       * ondansetron 2 MG/ML Soln injection   Commonly known as:  ZOFRAN   Used for:  Alcohol dependence with withdrawal with complication (H)        Dose:  4 mg   Inject 2 mLs (4 mg) into the vein every 30 minutes as needed for nausea or vomiting   Quantity:  15 mL   Refills:  0       pantoprazole 40 MG injection   Commonly known as:  PROTONIX   Used for:  Alcoholic gastritis with hemorrhage, unspecified chronicity        Dose:  40 mg   Inject 40 mg into the vein 2 times daily   Quantity:  30 each   Refills:  0       prochlorperazine 5 MG/ML injection   Commonly known as:  COMPAZINE   Used for:  Acute necrotizing pancreatitis        Dose:  5-10 mg    Inject 1-2 mLs (5-10 mg) into the vein every 6 hours as needed for nausea or vomiting   Quantity:  120 mL   Refills:  0       * Notice:  This list has 2 medication(s) that are the same as other medications prescribed for you. Read the directions carefully, and ask your doctor or other care provider to review them with you.      CONTINUE these medicines which may have CHANGED, or have new prescriptions. If we are uncertain of the size of tablets/capsules you have at home, strength may be listed as something that might have changed.        Dose / Directions    FLUoxetine 20 MG capsule   Commonly known as:  PROzac   This may have changed:  medication strength   Used for:  Alcohol dependence with withdrawal with complication (H)        Dose:  20 mg   Take 1 capsule (20 mg) by mouth daily   Quantity:  30 capsule   Refills:  0         CONTINUE these medicines which have NOT CHANGED        Dose / Directions    propranolol 10 MG tablet   Commonly known as:  INDERAL   Used for:  Alcohol dependence with withdrawal with complication (H)        Dose:  10 mg   Take 1 tablet (10 mg) by mouth 3 times daily   Quantity:  90 tablet   Refills:  0         STOP taking     hydrOXYzine 50 MG tablet   Commonly known as:  ATARAX                Where to get your medicines      Some of these will need a paper prescription and others can be bought over the counter. Ask your nurse if you have questions.     You don't need a prescription for these medications    - fluconazole 200-0.9 MG/100ML-% infusion  - FLUoxetine 20 MG capsule  - heparin (porcine) 100-0.45 UNIT/ML-% infusion  - lactated ringers injection  - ondansetron 2 MG/ML Soln injection  - ondansetron 2 MG/ML Soln injection  - pantoprazole 40 MG injection  - prochlorperazine 5 MG/ML injection  - propranolol 10 MG tablet      Information about where to get these medications is not yet available     ! Ask your nurse or doctor about these medications    - LORazepam 1 MG tablet              Protect others around you: Learn how to safely use, store and throw away your medicines at www.disposemymeds.org.             Medication List: This is a list of all your medications and when to take them. Check marks below indicate your daily home schedule. Keep this list as a reference.      Medications           Morning Afternoon Evening Bedtime As Needed    fluconazole 200-0.9 MG/100ML-% infusion   Commonly known as:  DIFLUCAN   Inject 100 mLs (200 mg) into the vein every 24 hours   Start taking on:  2/8/2017   Last time this was given:  400 mg on 2/7/2017  5:32 PM                                FLUoxetine 20 MG capsule   Commonly known as:  PROzac   Take 1 capsule (20 mg) by mouth daily                                heparin (porcine) 100-0.45 UNIT/ML-% infusion   Inject 0-3,500 Units/hr into the vein continuous   Last time this was given:  1,800 Units/hr on 2/7/2017  5:53 PM                                lactated ringers injection   Inject 200 mL/hr into the vein continuous   Last time this was given:  2/7/2017  5:32 PM                                LORazepam 1 MG tablet   Commonly known as:  ATIVAN   For Score   greater than or equal to  20-give 2-4 mg-repeat assessment/vitals in 30 min. For Score 15-19-give 1-2 mg & repeat assessment/vitals in 1 hr For Score 8-14- give 1-2 mg & repeat assessment/vitals in 2-4 hrs. For Score less than 8-do not give & repeat assessment/vitals in 4 hrs. For Score less than 8 x 2 do not give &repeat assessment/vitals in 8 hrs.   Last time this was given:  2 mg on 2/7/2017  6:05 PM                                * ondansetron 2 MG/ML Soln injection   Commonly known as:  ZOFRAN   Inject 2 mLs (4 mg) into the vein every 6 hours as needed for nausea or vomiting   Last time this was given:  4 mg on 2/7/2017  6:04 AM                                * ondansetron 2 MG/ML Soln injection   Commonly known as:  ZOFRAN   Inject 2 mLs (4 mg) into the vein every 30 minutes as needed for  nausea or vomiting   Last time this was given:  4 mg on 2/7/2017  6:04 AM                                pantoprazole 40 MG injection   Commonly known as:  PROTONIX   Inject 40 mg into the vein 2 times daily   Last time this was given:  2/7/2017 10:29 AM                                prochlorperazine 5 MG/ML injection   Commonly known as:  COMPAZINE   Inject 1-2 mLs (5-10 mg) into the vein every 6 hours as needed for nausea or vomiting                                propranolol 10 MG tablet   Commonly known as:  INDERAL   Take 1 tablet (10 mg) by mouth 3 times daily                                * Notice:  This list has 2 medication(s) that are the same as other medications prescribed for you. Read the directions carefully, and ask your doctor or other care provider to review them with you.

## 2017-02-07 NOTE — IP AVS SNAPSHOT
` ` Patient Information     Patient Name Sex     Isaiah Ramos ([id number]) Male 1980       Room Bed    7521 7521-01      Patient Demographics     Address Phone    [address]  [facility] [id number] [phone] (Home)  [phone] (Mobile)      Patient Ethnicity & Race     Ethnic Group Patient Race    American  or       Emergency Contact(s)     Name Relation Home Work Mobile    Jane Lackey Significant other [phone]      Documents on File        Status Date Received Description       Documents for the Patient    Affiliate Privacy placeholder   phase3    Consent for Services - Hospital/Clinic Received 07/13/15     Consent for EHR Access Received 07/13/15     Privacy Notice - [place] Received 07/13/15     Insurance Card Received 09/21/15 [facility]    External Medication Information Consent       Patient ID Received 07/13/15 [place] ID CARD    [facility] Specified Other       Consent for Services/Privacy Notice - Hospital/Clinic Received 16        Documents for the Encounter    CMS IM for Patient Signature         Admission Information     Attending Provider Admitting Provider Admission Type Admission Date/Time    Bernardo Carrillo MD Usher, Francis Ngo MD Urgent 17  0434    Discharge Date Hospital Service Auth/Cert Status Service Area     Internal Medicine [facility]    Unit Room/Bed Admission Status    UU U7D 7521/7521-01 Admission (Confirmed)            Admission     Complaint    Necrotizing pancreatitis, Splenic vein thrombosis, Acute necrotizing pancreatitis, GI bleed      Hospital Account     Name Acct ID Class Status Primary Coverage    Omkar ArisIsaiah [id number] [facility]            Guarantor Account (for Hospital Account #[id number])     Name Relation to Pt Service Area Active? Acct Type    Omkar HurstIsaiah Self FCS Yes  Personal/Family    Address Phone          [address]  [place] [address] [phone](H)              Coverage Information (for Hospital Account #[id number])     F/O Payor/Plan Precert #    [facility] PLAN/[facility] AND [facility]     Subscriber Subscriber #    Isaiah Ramos [id number]    Address Phone    [address] [facility] [address]  [place] [address] [phone]

## 2017-02-07 NOTE — ANESTHESIA PREPROCEDURE EVALUATION
Anesthesia Evaluation         Anesthesia Plan      History & Physical Review  History and physical reviewed and following examination; no interval change.    ASA Status:  3 .    NPO Status:  > 6 hours    Plan for MAC with Intravenous induction. Maintenance will be TIVA.  Reason for MAC:  Difficulty with conscious sedation (QS)         Postoperative Care      Consents  Anesthetic plan, risks, benefits and alternatives discussed with:  Patient..          ANESTHESIA PREOP EVALUATION    NPO Status: NPOYes, NPO    Procedure: Procedure(s):   - Wound Class: II-Clean Contaminated    HPI: Presents for EGD     PMHx/PSHx/ROS:  PAST MEDICAL HISTORY:   Past Medical History   Diagnosis Date     Hypertension      Depressive disorder        PAST SURGICAL HISTORY: History reviewed. No pertinent past surgical history.    FAMILY HISTORY: History reviewed. No pertinent family history.      Past Anes Hx: No personal or family h/o anesthesia problems    Soc Hx:   Tobacco:   EtOH:     Allergies:   Allergies   Allergen Reactions     Naproxen Rash       Meds:   Prescriptions prior to admission   Medication Sig Dispense Refill Last Dose     FLUOXETINE HCL PO Take 20 mg by mouth daily    2/4/2017 at Unknown time     hydrOXYzine (ATARAX) 50 MG tablet Take 1 tablet (50 mg) by mouth every 6 hours as needed for itching 10 tablet 0 More than a month at Unknown time     PROPRANOLOL HCL PO Take 10 mg by mouth 3 times daily   02/04/2017       No current outpatient prescriptions on file.       Physical Exam:  VS: T 95.2, P Data Unavailable, /119, R 18, SpO2 96% Weight   Wt Readings from Last 2 Encounters:   01/09/17 99.139 kg (218 lb 9 oz)   02/16/16 87.091 kg (192 lb)         Airway: MP 2, TM>3FB, Neck full ROM  Dentition: no loose teeth  Heart: RRR  Lungs: CTAB      BMP:  NA      128   2/7/2017   POTASSIUM      3.8   2/7/2017  CHLORIDE       94   2/7/2017  PARMINDER      6.7   2/7/2017  CO2       21   2/7/2017  BUN       13   2/7/2017  CR     0.71    2/7/2017  GLC      137   2/7/2017     CBC:  WBC     24.6   2/7/2017  HGB     16.9   2/7/2017  HCT     48.4   2/7/2017  PLT       86   2/7/2017     Coags/Type and Screen  INR     1.42   2/7/2017  No results found for this basename: PT  Type and Screen:      Assessment/Plan:  - ASA 3  - MAC with standard ASA monitors, IV induction, balanced anesthetic  - PIV  - Antibiotics per surgery  - PONV prophylaxis  - Blood products available, possible administration discussed with patient  Kevin Edwards MD    2/7/2017  1:29 PM

## 2017-02-07 NOTE — PLAN OF CARE
Problem: Goal Outcome Summary  Goal: Goal Outcome Summary  A, Drs aware BP elevated to 150s/110s. MSSA  Score 76, fine tremor, oriented x 4, calm. Pain significant, able to rest after med. Wife present. Octreotide and Protonix gtts begun. To US and then directly to endoscopy and EGD done with sedation. Return 1430 alert BP still 160/110s.  Octreotide and Protonix gtts discontinued. Lab notified pt returned to unit, coming to draw blood culture, CBC and BMP.

## 2017-02-07 NOTE — CONSULTS
GASTROENTEROLOGY CONSULTATION      Date of Admission:  2/7/2017  Consulting physician: Michelle Jaramillo NP          ASSESSMENT AND RECOMMENDATIONS:     Assessment:    37 yo male h/o alcohol abuse, HTN, depression, anxiety coming as  Transfer from [place], [place] where he presented with worsening abdominal pain for past 3 days.   -He was diagnosed with pancreatitis based on lipase of around 1000, pain and CT findings. On reviewing imaging CT abdomen w contrast, he has acute peripancreatic fluid collection, no necrosis. His BISAP 1 ( SIRS +). Most likely alcohol induced.   -He was also had possible GI bleed based on history of coffee ground emesis and melena and it has resolved, EGD performed today showed no varices, diffuse gastric erythema, therefore most likely it was from Tori Mitchell tear, or slow oozing from  gastritis, portal hypertensive gastropathy.    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  USG did not show any gallstones.      Recommendations    Continue aggressive fluid resuscitation with LR, continue maintenance fluid. Recommend starting low fat clear liquid diet today and ADAT. Consider NJ if not tolerating oral intake in couple of days.     Daily CBC, hematocrit, BMP, LFTs.     Recommend checking triglycerides.     Continue oral  Omeprazole 20 mg BID, stop  octreotide.     Recommend starting anticoagulation for PVT considering acute etiology, he will need close f/u at [facility], need to be treated for 3-6 months.     Recommend getting CT reviewed by radiologist here at [facility].     Recommend getting outside records from [facility].     Alcohol cessation, will need social work consult.       Gastroenterology follow up recommendations: f/u at [facility].       Thank you for involving us in this patient's care. Please do  not hesitate to contact the GI service with any questions or concerns. Pt care plan discussed with Dr. Staples. , GI staff physician.    Nadya Garcia  GI fellow         Chief Complaint:   Abdominal pain         HPI:     37 yo male h/o alcohol abuse, HTN, depression, anxiety coming as  Transfer from [place], [place] where he presented with worsening abdominal pain for past 3 days. According to him he was having chronic pain for past 2 months on/off. For past 3 days pain became constant, epigastric and radiating to left abdomen. He had nausea and multiple episodes of vomiting on Saturday, he was not able to keep anything down. He had coffee ground emesis on Saturday initially and it resolved yesterday. He denied any fever but feeling cold. He never had this problem before. He was drinking around 1 litre of vodka daily, last drink yesterday. He smokes around 0.5 pack per day. He was told around 2 years ago that he has fatty liver, not aware of cirrhosis. He never had ascites, GI bleed before. He denied any NSAIDs, not on any anticoagulation. He denied any chest pain, SOB, palpitations.            Past Medical History:     Reviewed and edited as appropriate  Past Medical History   Diagnosis Date     Hypertension      Depressive disorder             Past Surgical History:   Reviewed and edited as appropriate   No past surgical history on file.         Previous Endoscopy:   No results found for this or any previous visit.         Social History:   Reviewed and edited as appropriate  Social History     Social History     Marital Status: Single     Spouse Name: N/A     Number of Children: N/A     Years of Education: N/A     Occupational History     Not on file.     Social History Main Topics     Smoking status: Current Every Day Smoker -- 0.25 packs/day     Smokeless tobacco: Never Used     Alcohol Use: Yes     Drug Use: No     Sexual Activity: Not on file     Other Topics Concern     Not on file     Social History  Narrative            Family History:   Reviewed and edited as appropriate  Brother  from alcohol liver disease.       Allergies:   Reviewed and edited as appropriate     Allergies   Allergen Reactions     Naproxen Rash            Medications:     Current Facility-Administered Medications   Medication     [START ON 2017] influenza quadrivalent (PF) vacc age 3 yrs and older (FLUZONE or Flulaval) injection 0.5 mL     naloxone (NARCAN) injection 0.1-0.4 mg     ondansetron (ZOFRAN-ODT) ODT tab 4 mg    Or     ondansetron (ZOFRAN) injection 4 mg     LORazepam (ATIVAN) tablet 1-4 mg     HYDROmorphone (PF) (DILAUDID) injection 0.5-1 mg     [START ON 2017] pneumococcal vaccine (PNEUMOVAX 23-carlos) injection 0.5 mL     potassium chloride SA (K-DUR/KLOR-CON M) CR tablet 20-40 mEq     potassium chloride (KLOR-CON) Packet 20-40 mEq     potassium chloride 10 mEq in 100 mL intermittent infusion     potassium chloride 10 mEq in 100 mL intermittent infusion with 10 mg lidocaine     potassium chloride 20 mEq in 50 mL intermittent infusion     magnesium sulfate 4 g in 100 mL sterile water (premade)     sodium phosphate 15 mmol in D5W intermittent infusion     sodium phosphate 20 mmol in D5W intermittent infusion     sodium phosphate 25 mmol in D5W intermittent infusion     cefTRIAXone (ROCEPHIN) 1 g vial to attach to  mL bag for ADULTS or NS 50 mL bag for PEDS     ondansetron (ZOFRAN-ODT) ODT tab 4 mg    Or     ondansetron (ZOFRAN) injection 4 mg     prochlorperazine (COMPAZINE) injection 5-10 mg     meperidine (DEMEROL) injection 12.5 mg     naloxone (NARCAN) injection 0.1-0.4 mg     pantoprazole (PROTONIX) 40 mg IV push injection     lactated ringers infusion     fluconazole (DIFLUCAN) intermittent infusion 400 mg in NaCl     [START ON 2017] fluconazole (DIFLUCAN) intermittent infusion 200 mg in NaCl             Review of Systems:   A complete review of systems was performed and is negative except as noted in the  HPI           Physical Exam:     /110 mmHg  Temp(Src) 95.2  F (35.1  C) (Oral)  Resp 16  SpO2 93%  Wt:   Wt Readings from Last 2 Encounters:   01/09/17 99.139 kg (218 lb 9 oz)   02/16/16 87.091 kg (192 lb)        Constitutional: drowsy, not dyspneic/diaphoretic, no acute distress, AAO x 3.   Eyes: Sclera anicteric/ no pallor  Ears/nose/mouth/throat: Normal oropharynx without ulcers or exudate, mucus membranes moist,  CV: Normal S1, S2, no murmurs.  Respiratory: clear to auscultation b/l, no murmur  Abd: Nondistended, +bs, tenderness in mid and left abdomen, no peritoneal signs  Skin: warm, perfused, no jaundice  Neuro: AAO x 3, no focal motor sensory deficits           Data:   Labs and imaging below were independently reviewed and interpreted    BMP  Recent Labs  Lab 02/07/17 0613   *   POTASSIUM 3.8   CHLORIDE 94   PARMINDER 6.7*   CO2 21   BUN 13   CR 0.71   *     CBC  Recent Labs  Lab 02/07/17  0613   WBC 24.6*   RBC 5.49   HGB 16.9   HCT 48.4   MCV 88   MCH 30.8   MCHC 34.9   RDW 15.3*   PLT 86*     INR  Recent Labs  Lab 02/07/17  0613   INR 1.42*     LFTs  Recent Labs  Lab 02/07/17  0613   ALKPHOS 116   *   *   BILITOTAL 2.4*   PROTTOTAL 6.0*   ALBUMIN 2.6*      PANC  Recent Labs  Lab 02/07/17 0613   LIPASE 814*   AMYLASE 82       Imaging: reviewed

## 2017-02-07 NOTE — OR NURSING
EGD with brushing done under MAC anesthesia. CRNA monitored VS, administered meds, and escorted pt to recovery. Handoff report given to floor RN.

## 2017-02-07 NOTE — PROGRESS NOTES
Care Coordinator- Assessment Note     Admission Date/Time:  2/7/2017  Attending MD:  Bernardo Carrillo MD     Data  Chart reviewed, discussed with interdisciplinary team.   Patient was admitted for: No diagnosis found.     History: ETOH abuse     Patient currently admitted with:  3 day of diffuse abdominal pain, nausea, vomiting, coffee ground emesis and melena     RNCC Assessment  Concerns with insurance coverage for discharge needs: None.  Current Living Situation: Patient lives with spouse.  Support System: Supportive  Services Involved: none at the time of admit  Transportation: Agency transportation  Barriers to Discharge: medical plan of care    Patient is restricted to [facility] for medical care.  Call placed to [facility] ([phone] ask for patient placement if they do not call for report) for transfer.  MD to MD report given. Transportation with [facility] set up for 8pm transfer.        Plan  Anticipated Discharge Date:  02/07/2017    Anticipated Discharge Plan:  To [facility]    CTS Handoff completed: husam Mosquera RN, BSN, PHN, RNCCPH: [phone]  Pager: [phone]  Covering for : Medicine RNCC

## 2017-02-07 NOTE — IP AVS SNAPSHOT
[address] [address]    Phone:  [phone]                                       After Visit Summary   2/7/2017    Isaiah Hurst    MRN: [id number]           After Visit Summary Signature Page     I have received my discharge instructions, and my questions have been answered. I have discussed any challenges I see with this plan with the nurse or doctor.    ..........................................................................................................................................  Patient/Patient Representative Signature      ..........................................................................................................................................  Patient Representative Print Name and Relationship to Patient    ..................................................               ................................................  Date                                            Time    ..........................................................................................................................................  Reviewed by Signature/Title    ...................................................              ..............................................  Date                                                            Time

## 2017-02-07 NOTE — PLAN OF CARE
Nursing Focus: Admission    D: Patient admitted from [facility] via ambulance.  Patient admitted for management of necrotizing pancreatitis, and was found to have an active GI bleed. Patient has a 3 day history of coffee ground emesis, melena, sharp stabbing abdominal pain. Pt also has history of alcohol abuse, drinks 1 pint of vodka per day, last drink was yesterday per pt.  Pt endorses anxiety and depression as well, attributes his high blood pressure to anxiety.     I: Upon arrival to the unit patient was oriented to room, unit, and call light. Patient s height, weight, and vital signs were obtained. Allergies reviewed and allergy band applied. MD notified of patient s arrival on the unit. Adult AVS completed. Head to toe assessment completed. Full skin assessment completed. Education assessment completed. Care plan initiated.    A: BP is elevated, MD aware. OVSS. Patient pain is sharp and stabbing along left side of abodminal wall. Dilaudid x 1 with good relief so far, also using T pump.  Patients skin is intact.  Pt received 1 mg ativan per MSSA protocol, scored 8/20 due to insomnia, lack of appetite, and very slight tremor in fingers. Q2-4hr checks. Pt will need additional PIV for multiple continuous IV infusions, vascular access consult placed.     P: Continue to monitor patient s pain and intervene as needed. Continue with plan of care. Notify MD with any concerns or changes in patient status.

## 2017-02-08 ENCOUNTER — CARE COORDINATION (OUTPATIENT)
Dept: CARDIOLOGY | Facility: CLINIC | Age: 37
End: 2017-02-08

## 2017-02-08 NOTE — PROGRESS NOTES
Transferring to Medicine 1 at [facility] for insurance coverage. Report given prior to discharge. Sent with [facility]. PIV X3 saline locked. Dilaudid given by request prior to discharge.

## 2017-02-08 NOTE — PROGRESS NOTES
"[facility]  \"Hello, my name is Denisse Hair , and I am calling from the [facility].  I want to check in and see how you are doing, after leaving the hospital.  You may also receive a call from your Care Coordinator (care team), but I want to make sure you don t have any urgent needs.  I have a couple questions to review with you:     Post-Discharge Outreach                                                    Isaiah Hurst is a 36 year old male     Follow-up Appointments      Follow Up and recommended labs and tests        Follow up with [facility] admitting [place] upon arrival.  Recommended labs: CBC, CMP, lipase daily.  Hgb q 6 hours. INR daily for MELD calculation.                    Care Team:    Patient Care Team       Relationship Specialty Notifications Start End    Lore Arellano NP PCP - General   9/21/15     Phone: [phone] Fax: [phone]         [facility] [address] [facility] [address]            Transition of Care Review                                                      Patient was called three times and no answer so post 24 hr DC follow up calls will be closed out               Plan                                                      Thanks for your time.  Your Care Coordinator may follow-up within the next couple days.  In the meantime if you have questions, concerns or problems call your care team.        Denisse Hair      "

## 2017-02-13 LAB
BACTERIA SPEC CULT: NO GROWTH
BACTERIA SPEC CULT: NO GROWTH
MICRO REPORT STATUS: NORMAL
MICRO REPORT STATUS: NORMAL
SPECIMEN SOURCE: NORMAL
SPECIMEN SOURCE: NORMAL

## 2017-03-07 LAB
FUNGUS SPEC CULT: ABNORMAL
MICRO REPORT STATUS: ABNORMAL
SPECIMEN SOURCE: ABNORMAL

## 2018-09-07 ENCOUNTER — HOSPITAL ENCOUNTER (EMERGENCY)
Facility: CLINIC | Age: 38
Discharge: HOME | End: 2018-09-07
Attending: EMERGENCY MEDICINE | Admitting: EMERGENCY MEDICINE
Payer: COMMERCIAL

## 2018-09-07 VITALS
HEART RATE: 72 BPM | OXYGEN SATURATION: 100 % | RESPIRATION RATE: 16 BRPM | DIASTOLIC BLOOD PRESSURE: 112 MMHG | TEMPERATURE: 98.6 F | SYSTOLIC BLOOD PRESSURE: 160 MMHG

## 2018-09-07 DIAGNOSIS — M79.672 FOOT PAIN, BILATERAL: ICD-10-CM

## 2018-09-07 DIAGNOSIS — F10.239 ALCOHOL DEPENDENCE WITH WITHDRAWAL WITH COMPLICATION (H): ICD-10-CM

## 2018-09-07 DIAGNOSIS — M79.671 FOOT PAIN, BILATERAL: ICD-10-CM

## 2018-09-07 PROCEDURE — 99282 EMERGENCY DEPT VISIT SF MDM: CPT

## 2018-09-07 PROCEDURE — 99283 EMERGENCY DEPT VISIT LOW MDM: CPT | Mod: Z6 | Performed by: EMERGENCY MEDICINE

## 2018-09-07 RX ORDER — PROPRANOLOL HYDROCHLORIDE 10 MG/1
20 TABLET ORAL 3 TIMES DAILY
Qty: 20 TABLET | Refills: 0 | Status: SHIPPED | OUTPATIENT
Start: 2018-09-07

## 2018-09-07 RX ORDER — IBUPROFEN 600 MG/1
600 TABLET, FILM COATED ORAL EVERY 6 HOURS PRN
Qty: 20 TABLET | Refills: 0 | Status: SHIPPED | OUTPATIENT
Start: 2018-09-07

## 2018-09-07 ASSESSMENT — ENCOUNTER SYMPTOMS
NECK STIFFNESS: 0
COLOR CHANGE: 0
ABDOMINAL PAIN: 0
DIFFICULTY URINATING: 0
HEADACHES: 0
ARTHRALGIAS: 0
CONFUSION: 0
EYE REDNESS: 0
SHORTNESS OF BREATH: 0
FEVER: 0

## 2018-09-07 NOTE — ED PROVIDER NOTES
History     Chief Complaint   Patient presents with     Foot Pain     working in the rain: feet were wet: feet burning and swollen     HPI  Isaiah Hurst is a 37 year old male with a history of HTN and depression who presents to the Emergency Department today for evaluation of foot pain. The patient states that he recently got a new job as a .  The patient reports that he worked a 10 hour shift on Wednesday and was not wearing his work boots and states that he had pain on the bottom of his feet bilaterally.  The patient states that he rested yesterday to improve his for pain, however, the patient states that he still has pain today and presents the ED for further evaluation.  The patient states he has not had pain like this before.  The patient also reports that he is from out of town and forgot his hypertension medication at home.  The patient states that he takes 2 tablets of propranolol 20 mg per day and will be in town for 4 days and is asking for a 40 prescription of this.    I have reviewed the Medications, Allergies, Past Medical and Surgical History, and Social History in the [employer] system.    Past Medical History:   Diagnosis Date     Depressive disorder      Hypertension        Past Surgical History:   Procedure Laterality Date     ESOPHAGOSCOPY, GASTROSCOPY, DUODENOSCOPY (EGD), COMBINED N/A 2/7/2017    Procedure: COMBINED ESOPHAGOSCOPY, GASTROSCOPY, DUODENOSCOPY (EGD);  Surgeon: Guru William Staples MD;  Location: [facility]       No family history on file.    Social History   Substance Use Topics     Smoking status: Current Every Day Smoker     Packs/day: 0.25     Smokeless tobacco: Never Used     Alcohol use Yes       No current facility-administered medications for this encounter.      Current Outpatient Prescriptions   Medication     fluconazole (DIFLUCAN) 200-0.9 MG/100ML-% infusion     FLUoxetine (PROZAC) 20 MG capsule     ibuprofen (ADVIL/MOTRIN) 600 MG tablet     LISINOPRIL  PO     propranolol (INDERAL) 10 MG tablet     lactated ringers infusion     LORazepam (ATIVAN) 1 MG tablet     ondansetron (ZOFRAN) 2 MG/ML SOLN injection     ondansetron (ZOFRAN) 2 MG/ML SOLN injection     pantoprazole (PROTONIX) 40 mg IV push injection     prochlorperazine (COMPAZINE) 5 MG/ML injection     [DISCONTINUED] propranolol (INDERAL) 10 MG tablet        Allergies   Allergen Reactions     Naproxen Rash      Review of Systems   Constitutional: Negative for fever.   HENT: Negative for congestion.    Eyes: Negative for redness.   Respiratory: Negative for shortness of breath.    Cardiovascular: Negative for chest pain.   Gastrointestinal: Negative for abdominal pain.   Genitourinary: Negative for difficulty urinating.   Musculoskeletal: Negative for arthralgias and neck stiffness.        Pain on bottom of feet bilaterally   Skin: Negative for color change.   Neurological: Negative for headaches.   Psychiatric/Behavioral: Negative for confusion.   All other systems reviewed and are negative.    Physical Exam   BP: (!) 159/122  Pulse: 72  Temp: 98.1  F (36.7  C)  Resp: 16  SpO2: 98 %    Physical Exam   Musculoskeletal:        Right foot: There is tenderness. There is no bony tenderness and no swelling.        Feet:        ED Course   9:46 AM  The patient was seen and examined by Dr. Ribeiro in Room 02.    ED Course     Procedures             Labs Ordered and Resulted from Time of ED Arrival Up to the Time of Departure from the ED - No data to display         Assessments & Plan (with Medical Decision Making)   37-year-old male presents for evaluation of bilateral foot pain.  Exam reveals elevated blood pressure in the setting of not taking antihypertensives as well as bilateral tenderness over the second and third metatarsal heads.  Differential includes plantar fasciitis, possible fracture, metatarsalgia, other.  I have recommended use of metatarsal pads, elevation, appropriate foot wear and follow-up with the  primary physician.  He has used ibuprofen without difficulty despite a recorded allergy to naproxen.  He was prescribed 600 mg tablets of ibuprofen to use for pain.  In addition, I have recommended use of metatarsal pads.  As well, I recommended follow-up with primary physician if his pain is not resolving in the next several days.    I have reviewed the nursing notes.    I have reviewed the findings, diagnosis, plan and need for follow up with the patient.    New Prescriptions    IBUPROFEN (ADVIL/MOTRIN) 600 MG TABLET    Take 1 tablet (600 mg) by mouth every 6 hours as needed for pain       Final diagnoses:   Foot pain, bilateral   IHenrik, am serving as a trained medical scribe to document services personally performed by Koko Ribeiro MD, based on the provider's statements to me.   IKoko MD, was physically present and have reviewed and verified the accuracy of this note documented by Henrik Valentine.     9/7/2018   [facility], [place], EMERGENCY DEPARTMENT     Med Ribeiro MD  09/07/18 1007